# Patient Record
Sex: FEMALE | Race: WHITE | NOT HISPANIC OR LATINO
[De-identification: names, ages, dates, MRNs, and addresses within clinical notes are randomized per-mention and may not be internally consistent; named-entity substitution may affect disease eponyms.]

---

## 2017-01-12 ENCOUNTER — TRANSCRIPTION ENCOUNTER (OUTPATIENT)
Age: 52
End: 2017-01-12

## 2017-09-21 ENCOUNTER — TRANSCRIPTION ENCOUNTER (OUTPATIENT)
Age: 52
End: 2017-09-21

## 2017-11-08 ENCOUNTER — TRANSCRIPTION ENCOUNTER (OUTPATIENT)
Age: 52
End: 2017-11-08

## 2017-11-17 ENCOUNTER — APPOINTMENT (OUTPATIENT)
Dept: PAIN MANAGEMENT | Facility: CLINIC | Age: 52
End: 2017-11-17

## 2018-01-29 ENCOUNTER — TRANSCRIPTION ENCOUNTER (OUTPATIENT)
Age: 53
End: 2018-01-29

## 2018-02-06 ENCOUNTER — TRANSCRIPTION ENCOUNTER (OUTPATIENT)
Age: 53
End: 2018-02-06

## 2018-05-18 ENCOUNTER — TRANSCRIPTION ENCOUNTER (OUTPATIENT)
Age: 53
End: 2018-05-18

## 2018-12-28 ENCOUNTER — TRANSCRIPTION ENCOUNTER (OUTPATIENT)
Age: 53
End: 2018-12-28

## 2019-07-01 ENCOUNTER — TRANSCRIPTION ENCOUNTER (OUTPATIENT)
Age: 54
End: 2019-07-01

## 2019-08-21 ENCOUNTER — TRANSCRIPTION ENCOUNTER (OUTPATIENT)
Age: 54
End: 2019-08-21

## 2020-07-20 ENCOUNTER — TRANSCRIPTION ENCOUNTER (OUTPATIENT)
Age: 55
End: 2020-07-20

## 2022-04-27 ENCOUNTER — APPOINTMENT (OUTPATIENT)
Dept: ORTHOPEDIC SURGERY | Facility: CLINIC | Age: 57
End: 2022-04-27

## 2022-05-01 ENCOUNTER — FORM ENCOUNTER (OUTPATIENT)
Age: 57
End: 2022-05-01

## 2022-05-02 ENCOUNTER — APPOINTMENT (OUTPATIENT)
Dept: MRI IMAGING | Facility: CLINIC | Age: 57
End: 2022-05-02
Payer: MEDICARE

## 2022-05-02 PROCEDURE — 73221 MRI JOINT UPR EXTREM W/O DYE: CPT | Mod: RT,MH

## 2022-05-09 ENCOUNTER — APPOINTMENT (OUTPATIENT)
Dept: ORTHOPEDIC SURGERY | Facility: CLINIC | Age: 57
End: 2022-05-09
Payer: MEDICARE

## 2022-05-09 VITALS — WEIGHT: 142 LBS | BODY MASS INDEX: 22.82 KG/M2 | HEIGHT: 66 IN

## 2022-05-09 DIAGNOSIS — M75.01 ADHESIVE CAPSULITIS OF RIGHT SHOULDER: ICD-10-CM

## 2022-05-09 DIAGNOSIS — M25.511 PAIN IN RIGHT SHOULDER: ICD-10-CM

## 2022-05-09 DIAGNOSIS — Z98.890 OTHER SPECIFIED POSTPROCEDURAL STATES: ICD-10-CM

## 2022-05-09 PROCEDURE — 99214 OFFICE O/P EST MOD 30 MIN: CPT

## 2022-05-09 PROCEDURE — 73030 X-RAY EXAM OF SHOULDER: CPT | Mod: RT

## 2022-05-09 RX ORDER — METHYLPREDNISOLONE 4 MG/1
4 TABLET ORAL
Qty: 1 | Refills: 0 | Status: ACTIVE | COMMUNITY
Start: 2022-05-09 | End: 1900-01-01

## 2022-05-09 NOTE — REASON FOR VISIT
[FreeTextEntry2] : THis is a 56 year old RHD F disabled due to multiple spine fusions with right shoulder pain that started after doing biceps curl with incorrect form in early 2022.  No history.  She can't sleep.  Rx Naprosyn helps.  There is a history of a labral repair at [facility] in 2013.  There was no pre or post op dislocation.  She reports stiffness since the surgery.  She has done PT.  No injections, no numbness, or fcs.

## 2022-05-09 NOTE — HISTORY OF PRESENT ILLNESS
[Sudden] : sudden [10] : 10 [6] : 6 [Dull/Aching] : dull/aching [Constant] : constant [Meds] : meds [de-identified] : pt is here today for a new consult for her right shoulder. pt was seeing   [] : no [FreeTextEntry1] : right shoulder  [FreeTextEntry5] : pt was exercising a few months ago, doing curls and injured her right shoulder, she felt a pop in the shoulder. pt has a hx of surgery to that shoulder. pt has limited mobility to the shoulder  [FreeTextEntry7] : down the arm sometimes [FreeTextEntry9] : naproxen  [de-identified] : movement  [de-identified] : 03/2022 [de-identified] : dr.price  [de-identified] : 2013 [de-identified] : XR and MRI

## 2022-05-09 NOTE — DATA REVIEWED
[FreeTextEntry1] : MRI 5/2022: The AC joint is inflamed, as are the adjacent bones.  There is an infraspinatus calcium.\par \par XR 2V:  There is a Type II acromion.

## 2022-05-09 NOTE — PHYSICAL EXAM
[Right] : right shoulder [Sitting] : sitting [Severe] : severe [Moderate] : moderate [] : no erythema [FreeTextEntry8] : There is acromial tenderness. [TWNoteComboBox4] : passive forward flexion 135 degrees [TWNoteComboBox6] : internal rotation L2 [de-identified] : external rotation 45 degrees

## 2022-05-09 NOTE — IMAGING
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  The GH joint is OK.  The calcium is noted.

## 2022-05-09 NOTE — ASSESSMENT
[FreeTextEntry1] : We discussed the underlying pathology. \par Treatment options reviewed. \par Medrol is advised.\par After that she will resume Naprosyn.\par She will obtain the op report.\par PT lead to her injury in 2013, by her report.\par An injection could be considered with PT.\par Cautions discussed. \par Questions answered.\par

## 2022-05-27 RX ORDER — NAPROXEN 500 MG/1
500 TABLET, DELAYED RELEASE ORAL
Qty: 60 | Refills: 0 | Status: DISCONTINUED | COMMUNITY
Start: 2022-05-09 | End: 2022-05-27

## 2022-05-27 RX ORDER — NAPROXEN 500 MG/1
500 TABLET ORAL
Qty: 60 | Refills: 0 | Status: ACTIVE | COMMUNITY
Start: 2022-05-27 | End: 1900-01-01

## 2022-05-31 ENCOUNTER — FORM ENCOUNTER (OUTPATIENT)
Age: 57
End: 2022-05-31

## 2022-05-31 ENCOUNTER — APPOINTMENT (OUTPATIENT)
Dept: NEUROLOGY | Facility: CLINIC | Age: 57
End: 2022-05-31
Payer: MEDICARE

## 2022-05-31 VITALS
WEIGHT: 142 LBS | BODY MASS INDEX: 22.82 KG/M2 | SYSTOLIC BLOOD PRESSURE: 147 MMHG | HEIGHT: 66 IN | DIASTOLIC BLOOD PRESSURE: 83 MMHG | HEART RATE: 94 BPM

## 2022-05-31 DIAGNOSIS — M48.02 SPINAL STENOSIS, CERVICAL REGION: ICD-10-CM

## 2022-05-31 PROCEDURE — 99205 OFFICE O/P NEW HI 60 MIN: CPT

## 2022-06-02 NOTE — HISTORY OF PRESENT ILLNESS
[FreeTextEntry1] : Reason for consult: MS\par \par HPI: JESUS MANUEL ONTIVEROS is a 56 year old woman \par \par Referred by Dr. Brown, friend of his.\par 2009 - had cervical surgery 2/2 severe neck pain with radiation of pain down to both arms, some numbness and weakness in the hands and legs. \par ~2017 - 1d of trouble with RLE, the next day with abrupt R facial and body severe R sided weakness and numbness, had trouble urinating, no difficulty with the other leg. Was admitted to [facility]. Was told that she had a stroke based on MRI/MRA. However, was told that there were other lesions so they wanted to do an LP which she opted against. Did not get IVMP. Took 1y to recover to normal.\par Then saw an outpatient neurologist who felt that she had MS and recommended DMT which she opted agaist. \par 2018 - lumbar surgery 2/2 due too severe LBP and numbness in buttocks/upper LLE.\par \par Over past several months, has had several sx: halos in her vision, "bolt of lightning" in the R field, \par less appetite, weight loss, BM abnormalities, urinary dysfunction (one episode of incontinence but denies frequency or urgency). \par \par \par ROS/Current Sx:\par halos in her vision, "bolt of lightning" in the R field, \par less appetite\par weight loss\par BM abnormalities\par urinary dysfunction (one episode of incontinence but denies frequency or urgency). \par \par PMHX:\par lumbar surgery 2018\par stroke vs MS 2017\par 2009 - cervical surgery.\par \par MEDS:\par cymbalta 60 daily\par synthroid\par asa 162 daily\par MV\par vitD 2ku daily\par biotin\par \par ALL: levaquin, avelox\par \par SHx: 1ppd, 4-5 beer/day, no drugs. on disability.\par \par FHx: stroke in mother at age 66, father w/ stroke at age 91.\par \par Vitals: hypertensive, i advised her to discuss with pmd.\par \par \par Exam:\par \par AO3.  Normally conversant.  Follows commands, names, and repeats.  Good attention.\par \par PERRL, VFF, EOMI, BL definite dissociated nystagmus when looking to L more than R but with full eye movements, face symmetric, TUP at midline.\par \par Motor: \par                                                 R:                               L:\par Del                                           5                                5\par Bi                                              5                               5\par Tri                                            5                               5\par Wrist Extensors                      5                               5\par Finger abductors                    5                               5-\par                                         5                               5 \par \par HF                                           5                               5\par KE                                           5                               5\par KF                                           5                               5\par DF                                           5                               5\par PF                                           5                               5\par \par Tone                                       R                               L\par UE                                          0                                0 \par LE                                          0                                0\par \par Sensory                                RUE                      LUE                 RLE                LLE     \par LT                                           +                            +                      +                   +\par Vib                                          +                            +                     mild                   +\par JPS                                         +                            +                      +                   +\par PP                                         +                            +                      +                   +\par Temp                                     +                            +                      +                   +\par \par Reflexes:\par                                              R                             L                            \par Biceps                                  2                             2\par BR                                        2                             2\par Triceps                                2                              2\par Pat                                        2                            2 \par AJ                                        2                             2\par \par TOES                                    F                            F\par \par \par Coordination:\par                                              R                             L                       \par FTN                                       0                             0 \par ANDREI                                      0                            0\par HTS                                      0                             0 \par \par Other                                                                          \par  \par Gait: normal, completes heel, toe, and tandem walk well, though cautious with tandem\par \par                     Assistance: none\par \par \par MRI brain 2/2018 (zprad) - reviewed, notable for several small circular t2h that are NOT in a distribution suggestive of primary demyelination, R int cap lacune.\par \par MRI C spine 11/2016 - no cord lesions.\par \par \par AP: 55yo w/ abrupt R sided weakness in 2017 thought to be a stroke, with several brain lesions noted at the time on MRI that were deemed suspicious for MS. LP and ultimately DMT were recommended but patient had opted against this. She developed several nonspecific symptoms over several months in early 2022, of unclear etiology. Neuro exam in 5/2022 was notable only for subtle bilateral dissociated nystagmus. MRI brain in 2018 revealed only several nonspecific lesions, without cord lesions in MRI C spine in 2016. \par \par Unclear etiology of symptoms. Based on MRI in 2018, I do not have a suspicion for MS and I believe the episode in 2017 was likely a vascular event. \par \par all questions answered, education provided, management discussed at length.\par \par - PMD f/u for abnormal BM, weight loss, decreased appetite\par - check new MRI brain, C, and T wo contrast to r/o new lesions\par - likely will refer to stroke neurology if updated imaging is not suggestive of MS\par

## 2022-06-06 ENCOUNTER — NON-APPOINTMENT (OUTPATIENT)
Age: 57
End: 2022-06-06

## 2022-06-19 ENCOUNTER — NON-APPOINTMENT (OUTPATIENT)
Age: 57
End: 2022-06-19

## 2022-06-20 ENCOUNTER — APPOINTMENT (OUTPATIENT)
Dept: ORTHOPEDIC SURGERY | Facility: CLINIC | Age: 57
End: 2022-06-20

## 2022-06-22 ENCOUNTER — TRANSCRIPTION ENCOUNTER (OUTPATIENT)
Age: 57
End: 2022-06-22

## 2022-06-22 ENCOUNTER — APPOINTMENT (OUTPATIENT)
Dept: NEUROLOGY | Facility: CLINIC | Age: 57
End: 2022-06-22
Payer: MEDICARE

## 2022-06-22 DIAGNOSIS — S06.0X9A CONCUSSION WITH LOSS OF CONSCIOUSNESS OF UNSPECIFIED DURATION, INITIAL ENCOUNTER: ICD-10-CM

## 2022-06-22 DIAGNOSIS — Z86.73 PERSONAL HISTORY OF TRANSIENT ISCHEMIC ATTACK (TIA), AND CEREBRAL INFARCTION W/OUT RESIDUAL DEFICITS: ICD-10-CM

## 2022-06-22 PROCEDURE — 99215 OFFICE O/P EST HI 40 MIN: CPT

## 2022-06-22 RX ORDER — AZITHROMYCIN 250 MG/1
250 TABLET, FILM COATED ORAL
Qty: 6 | Refills: 0 | Status: ACTIVE | COMMUNITY
Start: 2022-02-02

## 2022-06-22 RX ORDER — TRAZODONE HYDROCHLORIDE 50 MG/1
50 TABLET ORAL
Qty: 90 | Refills: 0 | Status: ACTIVE | COMMUNITY
Start: 2022-01-08

## 2022-06-22 RX ORDER — METRONIDAZOLE 500 MG/1
500 TABLET ORAL
Qty: 30 | Refills: 0 | Status: ACTIVE | COMMUNITY
Start: 2022-06-14

## 2022-06-22 RX ORDER — BENZONATATE 200 MG/1
200 CAPSULE ORAL
Qty: 30 | Refills: 0 | Status: ACTIVE | COMMUNITY
Start: 2022-01-02

## 2022-06-24 ENCOUNTER — APPOINTMENT (OUTPATIENT)
Age: 57
End: 2022-06-24

## 2022-06-24 ENCOUNTER — NON-APPOINTMENT (OUTPATIENT)
Age: 57
End: 2022-06-24

## 2022-06-24 PROCEDURE — 70543 MRI ORBT/FAC/NCK W/O &W/DYE: CPT

## 2022-06-24 PROCEDURE — 70551 MRI BRAIN STEM W/O DYE: CPT

## 2022-06-24 PROCEDURE — 70544 MR ANGIOGRAPHY HEAD W/O DYE: CPT | Mod: 59

## 2022-06-24 PROCEDURE — A9585: CPT

## 2022-06-24 PROCEDURE — A9585: CPT | Mod: JW

## 2022-06-24 PROCEDURE — 70549 MR ANGIOGRAPH NECK W/O&W/DYE: CPT

## 2022-06-28 ENCOUNTER — NON-APPOINTMENT (OUTPATIENT)
Age: 57
End: 2022-06-28

## 2022-07-20 ENCOUNTER — APPOINTMENT (OUTPATIENT)
Dept: NEUROLOGY | Facility: CLINIC | Age: 57
End: 2022-07-20

## 2022-08-15 ENCOUNTER — APPOINTMENT (OUTPATIENT)
Dept: NEUROSURGERY | Facility: CLINIC | Age: 57
End: 2022-08-15

## 2022-10-26 ENCOUNTER — APPOINTMENT (OUTPATIENT)
Dept: NEUROLOGY | Facility: CLINIC | Age: 57
End: 2022-10-26

## 2022-11-21 ENCOUNTER — NON-APPOINTMENT (OUTPATIENT)
Age: 57
End: 2022-11-21

## 2023-06-03 ENCOUNTER — NON-APPOINTMENT (OUTPATIENT)
Age: 58
End: 2023-06-03

## 2024-11-15 ENCOUNTER — EMERGENCY (EMERGENCY)
Facility: HOSPITAL | Age: 59
LOS: 1 days | Discharge: ANOTHER HOSPITAL | End: 2024-11-15
Attending: EMERGENCY MEDICINE | Admitting: EMERGENCY MEDICINE
Payer: MEDICARE

## 2024-11-15 ENCOUNTER — INPATIENT (INPATIENT)
Facility: HOSPITAL | Age: 59
LOS: 9 days | Discharge: HOME | DRG: 881 | End: 2024-11-25
Attending: PSYCHIATRY & NEUROLOGY | Admitting: PSYCHIATRY & NEUROLOGY
Payer: MEDICARE

## 2024-11-15 VITALS
DIASTOLIC BLOOD PRESSURE: 76 MMHG | RESPIRATION RATE: 17 BRPM | OXYGEN SATURATION: 95 % | SYSTOLIC BLOOD PRESSURE: 132 MMHG | HEART RATE: 81 BPM | TEMPERATURE: 99 F

## 2024-11-15 VITALS
HEIGHT: 66 IN | WEIGHT: 134.92 LBS | SYSTOLIC BLOOD PRESSURE: 155 MMHG | RESPIRATION RATE: 18 BRPM | OXYGEN SATURATION: 97 % | TEMPERATURE: 98 F | DIASTOLIC BLOOD PRESSURE: 85 MMHG | HEART RATE: 109 BPM

## 2024-11-15 VITALS — HEART RATE: 86 BPM | DIASTOLIC BLOOD PRESSURE: 68 MMHG | SYSTOLIC BLOOD PRESSURE: 126 MMHG | TEMPERATURE: 98 F

## 2024-11-15 DIAGNOSIS — Z98.89 OTHER SPECIFIED POSTPROCEDURAL STATES: Chronic | ICD-10-CM

## 2024-11-15 DIAGNOSIS — Z98.1 ARTHRODESIS STATUS: Chronic | ICD-10-CM

## 2024-11-15 DIAGNOSIS — F33.2 MAJOR DEPRESSIVE DISORDER, RECURRENT SEVERE WITHOUT PSYCHOTIC FEATURES: ICD-10-CM

## 2024-11-15 LAB
ALBUMIN SERPL ELPH-MCNC: 3.7 G/DL — SIGNIFICANT CHANGE UP (ref 3.3–5)
ALP SERPL-CCNC: 86 U/L — SIGNIFICANT CHANGE UP (ref 30–120)
ALT FLD-CCNC: 24 U/L — SIGNIFICANT CHANGE UP (ref 10–60)
AMPHET UR-MCNC: NEGATIVE — SIGNIFICANT CHANGE UP
ANION GAP SERPL CALC-SCNC: 8 MMOL/L — SIGNIFICANT CHANGE UP (ref 5–17)
APAP SERPL-MCNC: <1 UG/ML — LOW (ref 10–30)
AST SERPL-CCNC: 27 U/L — SIGNIFICANT CHANGE UP (ref 10–40)
BARBITURATES UR SCN-MCNC: NEGATIVE — SIGNIFICANT CHANGE UP
BASOPHILS # BLD AUTO: 0.07 K/UL — SIGNIFICANT CHANGE UP (ref 0–0.2)
BASOPHILS NFR BLD AUTO: 0.8 % — SIGNIFICANT CHANGE UP (ref 0–2)
BENZODIAZ UR-MCNC: NEGATIVE — SIGNIFICANT CHANGE UP
BILIRUB SERPL-MCNC: 0.4 MG/DL — SIGNIFICANT CHANGE UP (ref 0.2–1.2)
BUN SERPL-MCNC: 14 MG/DL — SIGNIFICANT CHANGE UP (ref 7–23)
CALCIUM SERPL-MCNC: 9.7 MG/DL — SIGNIFICANT CHANGE UP (ref 8.4–10.5)
CHLORIDE SERPL-SCNC: 104 MMOL/L — SIGNIFICANT CHANGE UP (ref 96–108)
CO2 SERPL-SCNC: 27 MMOL/L — SIGNIFICANT CHANGE UP (ref 22–31)
COCAINE METAB.OTHER UR-MCNC: NEGATIVE — SIGNIFICANT CHANGE UP
CREAT SERPL-MCNC: 0.8 MG/DL — SIGNIFICANT CHANGE UP (ref 0.5–1.3)
EGFR: 85 ML/MIN/1.73M2 — SIGNIFICANT CHANGE UP
EOSINOPHIL # BLD AUTO: 0.2 K/UL — SIGNIFICANT CHANGE UP (ref 0–0.5)
EOSINOPHIL NFR BLD AUTO: 2.2 % — SIGNIFICANT CHANGE UP (ref 0–6)
ETHANOL SERPL-MCNC: <3 MG/DL — SIGNIFICANT CHANGE UP (ref 0–3)
GLUCOSE SERPL-MCNC: 106 MG/DL — HIGH (ref 70–99)
HCT VFR BLD CALC: 37.8 % — SIGNIFICANT CHANGE UP (ref 34.5–45)
HGB BLD-MCNC: 12.7 G/DL — SIGNIFICANT CHANGE UP (ref 11.5–15.5)
IMM GRANULOCYTES NFR BLD AUTO: 0.3 % — SIGNIFICANT CHANGE UP (ref 0–0.9)
LYMPHOCYTES # BLD AUTO: 1.39 K/UL — SIGNIFICANT CHANGE UP (ref 1–3.3)
LYMPHOCYTES # BLD AUTO: 15.4 % — SIGNIFICANT CHANGE UP (ref 13–44)
MCHC RBC-ENTMCNC: 30.4 PG — SIGNIFICANT CHANGE UP (ref 27–34)
MCHC RBC-ENTMCNC: 33.6 G/DL — SIGNIFICANT CHANGE UP (ref 32–36)
MCV RBC AUTO: 90.4 FL — SIGNIFICANT CHANGE UP (ref 80–100)
METHADONE UR-MCNC: NEGATIVE — SIGNIFICANT CHANGE UP
MONOCYTES # BLD AUTO: 0.81 K/UL — SIGNIFICANT CHANGE UP (ref 0–0.9)
MONOCYTES NFR BLD AUTO: 9 % — SIGNIFICANT CHANGE UP (ref 2–14)
NEUTROPHILS # BLD AUTO: 6.52 K/UL — SIGNIFICANT CHANGE UP (ref 1.8–7.4)
NEUTROPHILS NFR BLD AUTO: 72.3 % — SIGNIFICANT CHANGE UP (ref 43–77)
NRBC # BLD: 0 /100 WBCS — SIGNIFICANT CHANGE UP (ref 0–0)
OPIATES UR-MCNC: NEGATIVE — SIGNIFICANT CHANGE UP
PCP SPEC-MCNC: SIGNIFICANT CHANGE UP
PCP UR-MCNC: NEGATIVE — SIGNIFICANT CHANGE UP
PLATELET # BLD AUTO: 355 K/UL — SIGNIFICANT CHANGE UP (ref 150–400)
POTASSIUM SERPL-MCNC: 4.6 MMOL/L — SIGNIFICANT CHANGE UP (ref 3.5–5.3)
POTASSIUM SERPL-SCNC: 4.6 MMOL/L — SIGNIFICANT CHANGE UP (ref 3.5–5.3)
PROT SERPL-MCNC: 7.8 G/DL — SIGNIFICANT CHANGE UP (ref 6–8.3)
RBC # BLD: 4.18 M/UL — SIGNIFICANT CHANGE UP (ref 3.8–5.2)
RBC # FLD: 14 % — SIGNIFICANT CHANGE UP (ref 10.3–14.5)
SALICYLATES SERPL-MCNC: 2.1 MG/DL — LOW (ref 2.8–20)
SARS-COV-2 RNA SPEC QL NAA+PROBE: SIGNIFICANT CHANGE UP
SODIUM SERPL-SCNC: 139 MMOL/L — SIGNIFICANT CHANGE UP (ref 135–145)
THC UR QL: NEGATIVE — SIGNIFICANT CHANGE UP
WBC # BLD: 9.02 K/UL — SIGNIFICANT CHANGE UP (ref 3.8–10.5)
WBC # FLD AUTO: 9.02 K/UL — SIGNIFICANT CHANGE UP (ref 3.8–10.5)

## 2024-11-15 PROCEDURE — 80053 COMPREHEN METABOLIC PANEL: CPT

## 2024-11-15 PROCEDURE — 36415 COLL VENOUS BLD VENIPUNCTURE: CPT

## 2024-11-15 PROCEDURE — 93010 ELECTROCARDIOGRAM REPORT: CPT

## 2024-11-15 PROCEDURE — 93005 ELECTROCARDIOGRAM TRACING: CPT

## 2024-11-15 PROCEDURE — 87635 SARS-COV-2 COVID-19 AMP PRB: CPT

## 2024-11-15 PROCEDURE — 85025 COMPLETE CBC W/AUTO DIFF WBC: CPT

## 2024-11-15 PROCEDURE — 80307 DRUG TEST PRSMV CHEM ANLYZR: CPT

## 2024-11-15 PROCEDURE — 99285 EMERGENCY DEPT VISIT HI MDM: CPT

## 2024-11-15 PROCEDURE — 90792 PSYCH DIAG EVAL W/MED SRVCS: CPT | Mod: 2W

## 2024-11-15 PROCEDURE — 99285 EMERGENCY DEPT VISIT HI MDM: CPT | Mod: 25

## 2024-11-15 RX ORDER — LEVOTHYROXINE SODIUM 150 MCG
100 TABLET ORAL DAILY
Refills: 0 | Status: DISCONTINUED | OUTPATIENT
Start: 2024-11-16 | End: 2024-11-25

## 2024-11-15 RX ORDER — NICOTINE 21 MG/24H
1 PATCH, EXTENDED RELEASE TRANSDERMAL DAILY
Refills: 0 | Status: DISCONTINUED | OUTPATIENT
Start: 2024-11-16 | End: 2024-11-19

## 2024-11-15 RX ORDER — HALOPERIDOL 2 MG
5 TABLET ORAL EVERY 6 HOURS
Refills: 0 | Status: DISCONTINUED | OUTPATIENT
Start: 2024-11-16 | End: 2024-11-25

## 2024-11-15 RX ORDER — DULOXETINE HCL 60 MG
60 CAPSULE,DELAYED RELEASE (ENTERIC COATED) ORAL DAILY
Refills: 0 | Status: DISCONTINUED | OUTPATIENT
Start: 2024-11-16 | End: 2024-11-25

## 2024-11-15 RX ORDER — LORAZEPAM 2 MG/1
2 TABLET ORAL EVERY 6 HOURS
Refills: 0 | Status: DISCONTINUED | OUTPATIENT
Start: 2024-11-16 | End: 2024-11-21

## 2024-11-15 RX ORDER — PANTOPRAZOLE SODIUM 40 MG/1
40 TABLET, DELAYED RELEASE ORAL
Refills: 0 | Status: DISCONTINUED | OUTPATIENT
Start: 2024-11-16 | End: 2024-11-25

## 2024-11-15 NOTE — ED PROVIDER NOTE - NSICDXPASTMEDICALHX_GEN_ALL_CORE_FT
PAST MEDICAL HISTORY:  Cervical disc disease     Constipation due to pain medication     Depression (emotion)     Fibromyalgia     Hypothyroid     Lumbar back pain     MVP (mitral valve prolapse)     TIA (transient ischemic attack)     Vaso vagal episode

## 2024-11-15 NOTE — ED PROVIDER NOTE - OBJECTIVE STATEMENT
Patient referred by her therapist and psychiatrist for worsening depression with suicidal ideation.  Patient relates she has a longstanding history of depression has been struggling especially for the past year and patient had relapsed with her alcohol abuse approximately 6 months ago and took extra meds was banging her head on the wall to harm herself was admitted at [facility] psychiatric unit patient has continued to follow with her psychiatrist and therapist since then however her depression has been worsening especially the past few days and she began to have thoughts of harming herself and may be taking extra pills.  Patient denies any suicidal acts.  Patient lives by herself.

## 2024-11-15 NOTE — ED PROVIDER NOTE - CLINICAL SUMMARY MEDICAL DECISION MAKING FREE TEXT BOX
Patient referred by her therapist and psychiatrist for worsening depression with suicidal ideation.  Patient relates she has a longstanding history of depression has been struggling especially for the past year and patient had relapsed with her alcohol abuse approximately 6 months ago and took extra meds was banging her head on the wall to harm herself was admitted at [facility] psychiatric unit patient has continued to follow with her psychiatrist and therapist since then however her depression has been worsening especially the past few days and she began to have thoughts of harming herself and may be taking extra pills.  Patient denies any suicidal acts.  Patient lives by herself.    Plan EKG labs telepsych consult    Differential including but not limited to depression suicidal ideation

## 2024-11-15 NOTE — ED BEHAVIORAL HEALTH ASSESSMENT NOTE - HPI (INCLUDE ILLNESS QUALITY, SEVERITY, DURATION, TIMING, CONTEXT, MODIFYING FACTORS, ASSOCIATED SIGNS AND SYMPTOMS)
Patient is a 59 year old female, single, living alone, no dependents, on disability for neck injury, PPHx MDD, anxiety, alcohol use disorder (last drink 6 months ago), 3 prior suicide attempts via overdose and cutting, last was 6 months ago), 3 prior admissions, Outpatient psychiatrist Dr. Horan, outpatient therapist Alok iDnh (both at [facility]), no history of violence, PMHx cervical fusion, hypothyroidism, Stroke (2017), who presents to ED BIB self for worsening depression and SI.     Patient reports long history of depression. Depression has been getting worse of the last year. She was previously stable on Cymbalta 60mg daily. Patient was not able to tolerate Cymbalta 60mg BID in the past, but can't remember why. She does not think she tried other dosages. Patient reports the she was started on Buspar 15mg BID 1 week ago and since then depression has gotten much worse and she has started having severe SI. Patient has been having thoughts of guilt and being a burden to the family. Patient states "I just don't want to be here anymore". Patient states that she has been googling the easiest way to kill herself. She reports the she doesn't have a reason to live. Since starting Buspar she also reports increased irritability and agitation. Energy levels and motivation are very low and she has been struggling to maintain hygiene. Patient reports eating too much. Patient states that her therapist and psychiatrist have had concerns about her condition and both have recommended she go to the ED for psych evaluation. Patient continues to endorse SI, was tearful throughout interview.

## 2024-11-15 NOTE — ED BEHAVIORAL HEALTH ASSESSMENT NOTE - SUMMARY
Patient is a 59 year old female, single, living alone, no dependents, on disability for neck injury, PPHx MDD, anxiety, alcohol use disorder (last drink 6 months ago), 3 prior suicide attempts via overdose and cutting, last was 6 months ago), 3 prior admissions, Outpatient psychiatrist Dr. Horan, outpatient therapist Alok Dinh (both at [facility]), no history of violence, PMHx cervical fusion, hypothyroidism, Stroke (2017), who presents to ED BIB self for worsening depression and SI.     Patient's presentation is consistent with a severe episode of MDD. Patient's symptoms appear to have been exacerbated by starting Buspar this week. Patient now with worsening depressive symptoms and active SI with a plan. Patient's outpatient psychiatrist and therapist both advocated for admission and urged the patient to go to the ED. Patient tearful throughout interview and in acute distress. Patient is high risk for harm to self, particularly given her history of recent suicide attempt 6 months ago. Recommend inpatient psychiatric admission for stabilization and acute safety concerns. Patient agreeable to voluntary admission, but can 2PC if she rescinds.     PLAN:  - Inpatient psychiatric admission on voluntary basis  - Can 2PC if patient rescinds consent  - STOP home Buspar  - STOP home clonazepam  - Increase Cymbalta to 80mg daily  - Ativan 0.5mg PO BID PRN for anxiety/agitation  - Medical workup per primary team  - 1:1 in the ED, not needed on secure unit

## 2024-11-15 NOTE — ED PROVIDER NOTE - PROGRESS NOTE DETAILS
d/w telepsych will see pt Telepsych relates patient accepted for transfer to [facility] psych unit under Dr. Jacobson

## 2024-11-15 NOTE — ED BEHAVIORAL HEALTH NOTE - BEHAVIORAL HEALTH NOTE
===================   PRE-HOSPITAL COURSE   ==================   SOURCE: ED notes / Rn Mingo   DETAILS: Patient presenting with worsening depression   ============   ED COURSE   ============   SOURCE: ED notes / Rn: Mingo   ARRIVAL: BIBSelf for: worsening depression.    BELONGINGS: All belongings appropriately confiscated. Pt on a 1:1   BEHAVIOR: Patient reportedly has been calm and compliant in the ED. According to nurse the patient is Aox4. Reported to have good hygiene with no cuts or bruises present.  Has Endorsed Si to staff with no plan and no Hi. No hallucination present.    TREATMENT: None reported   VISITORS: Sister bedside.

## 2024-11-15 NOTE — ED ADULT TRIAGE NOTE - CHIEF COMPLAINT QUOTE
"I've been depressed and having dark thoughts"   endorses SI without specific plan, denies HI - constant observation initiated upon arrival

## 2024-11-15 NOTE — ED PROVIDER NOTE - NSICDXPASTSURGICALHX_GEN_ALL_CORE_FT
PAST SURGICAL HISTORY:  S/P appendectomy     S/P cervical spinal fusion times 3    S/P laminectomy     S/P shoulder surgery labral repair

## 2024-11-15 NOTE — ED PROVIDER NOTE - NSICDXFAMILYHX_GEN_ALL_CORE_FT
FAMILY HISTORY:  Mother  Still living? No  Family history of colon cancer, Age at diagnosis: Age Unknown  Family history of stroke, Age at diagnosis: Age Unknown

## 2024-11-15 NOTE — ED ADULT NURSE REASSESSMENT NOTE - DESCRIPTION
Pt in ED for reports of unmanageable depression, states she was having "not good thoughts". Pt is awake, tearful, cooperative with staff, 1:1 with pt. Pt denies any thoughts of harming herself or attempts in the her history. Pt for voluntary admission to psych, explained by MD, papers signed.

## 2024-11-15 NOTE — ED BEHAVIORAL HEALTH ASSESSMENT NOTE - DESCRIPTION
see MSW note close with 2 sisters and brother who live in the area. Adult son 36 years old live in another state. Recently ended a relationship. Hypothyroidism, Stroke 2017, chronic neck pain and spinal fusion

## 2024-11-16 DIAGNOSIS — F10.21 ALCOHOL DEPENDENCE, IN REMISSION: ICD-10-CM

## 2024-11-16 RX ORDER — IBUPROFEN 200 MG
600 TABLET ORAL EVERY 6 HOURS
Refills: 0 | Status: DISCONTINUED | OUTPATIENT
Start: 2024-11-16 | End: 2024-11-25

## 2024-11-16 RX ORDER — HYDROXYZINE HYDROCHLORIDE 25 MG/1
25 TABLET, FILM COATED ORAL EVERY 8 HOURS
Refills: 0 | Status: DISCONTINUED | OUTPATIENT
Start: 2024-11-16 | End: 2024-11-25

## 2024-11-16 RX ORDER — LORAZEPAM 2 MG/1
0.5 TABLET ORAL ONCE
Refills: 0 | Status: DISCONTINUED | OUTPATIENT
Start: 2024-11-16 | End: 2024-11-16

## 2024-11-16 RX ORDER — ACETAMINOPHEN, DIPHENHYDRAMINE HCL, PHENYLEPHRINE HCL 325; 25; 5 MG/1; MG/1; MG/1
5 TABLET ORAL AT BEDTIME
Refills: 0 | Status: DISCONTINUED | OUTPATIENT
Start: 2024-11-16 | End: 2024-11-25

## 2024-11-16 RX ORDER — ACETAMINOPHEN, DIPHENHYDRAMINE HCL, PHENYLEPHRINE HCL 325; 25; 5 MG/1; MG/1; MG/1
5 TABLET ORAL AT BEDTIME
Refills: 0 | Status: DISCONTINUED | OUTPATIENT
Start: 2024-11-16 | End: 2024-11-16

## 2024-11-16 RX ORDER — INFLUENZA VIRUS VACCINE 15; 15; 15; 15 UG/.5ML; UG/.5ML; UG/.5ML; UG/.5ML
0.5 SUSPENSION INTRAMUSCULAR ONCE
Refills: 0 | Status: COMPLETED | OUTPATIENT
Start: 2024-11-16 | End: 2024-11-16

## 2024-11-16 RX ADMIN — Medication 600 MILLIGRAM(S): at 19:30

## 2024-11-16 RX ADMIN — Medication 100 MICROGRAM(S): at 09:28

## 2024-11-16 RX ADMIN — PANTOPRAZOLE SODIUM 40 MILLIGRAM(S): 40 TABLET, DELAYED RELEASE ORAL at 07:32

## 2024-11-16 RX ADMIN — NICOTINE 1 PATCH: 21 PATCH, EXTENDED RELEASE TRANSDERMAL at 09:28

## 2024-11-16 RX ADMIN — Medication 81 MILLIGRAM(S): at 09:28

## 2024-11-16 RX ADMIN — Medication 600 MILLIGRAM(S): at 18:44

## 2024-11-16 RX ADMIN — Medication 60 MILLIGRAM(S): at 09:28

## 2024-11-16 RX ADMIN — HYDROXYZINE HYDROCHLORIDE 25 MILLIGRAM(S): 25 TABLET, FILM COATED ORAL at 21:32

## 2024-11-16 RX ADMIN — ACETAMINOPHEN, DIPHENHYDRAMINE HCL, PHENYLEPHRINE HCL 5 MILLIGRAM(S): 325; 25; 5 TABLET ORAL at 21:32

## 2024-11-16 RX ADMIN — LORAZEPAM 0.5 MILLIGRAM(S): 2 TABLET ORAL at 09:52

## 2024-11-16 NOTE — BH INPATIENT PSYCHIATRY ASSESSMENT NOTE - NSBHASSESSSUMMFT_PSY_ALL_CORE
Patient is a 59 year old female, privately domiciled, unemployed on disability, PPHx of self-reported depression, anxiety, and alcohol use disorder, multiple prior psychiatric hospitalizations, no known SA, PMHx of cervical disc disease s/p spinal fusion, MVP, TIA, hypothyroid, fibromyalgia, admitted for worsening mood and suicidal ideation.    On exam, patient is calm, cooperative, and mildly anxious. She reports worsening mood and new onset passive SI over the past months due to losing friendships, inability to work (currently on disability and would lose it if she worked), and feeling more isolated. She currently denies SI/HI/AH/VH.  Clinical diagnosis most likely for depression, unspecified and MDD remains on the differential although she does not currently meet criteria. Plan to continue home duloxetine 60mg daily and continue longitudinal assessments and plan for safe dispo.    -Continue home duloxetine 60mg daily  -Melatonin 5mg qhs insomnia  -Nicotine patch  -Haldol 5mg / Ativan 2mg PRN agitation  -Atarax 25mg PRN anxiety  -Continue home levothyroxine, pantoprazole Patient is a 59 year old female, privately domiciled, unemployed on disability, PPHx of self-reported depression, anxiety, and alcohol use disorder, multiple prior psychiatric hospitalizations, no known SA, history of NSSIB (cutting), PMHx of cervical disc disease s/p spinal fusion, MVP, TIA, hypothyroid, fibromyalgia, admitted for worsening mood and suicidal ideation.    On exam, patient is calm, cooperative, and mildly anxious. She reports worsening mood and new onset passive SI over the past months due to losing friendships, inability to work (currently on disability and would lose it if she worked), and feeling more isolated. She currently denies SI/HI/AH/VH.  Clinical diagnosis most likely for depression, unspecified and MDD remains on the differential although she does not currently meet criteria. Plan to continue home duloxetine 60mg daily and continue longitudinal assessments and plan for safe dispo.    -Continue home duloxetine 60mg daily  -Melatonin 5mg qhs insomnia  -Nicotine patch  -Haldol 5mg / Ativan 2mg PRN agitation  -Atarax 25mg PRN anxiety  -Continue home levothyroxine, pantoprazole

## 2024-11-16 NOTE — BH INPATIENT PSYCHIATRY ASSESSMENT NOTE - NSBHMETABOLIC_PSY_ALL_CORE_FT
BMI: BMI (kg/m2): 21.8 (11-15-24 @ 14:48)  HbA1c:   Glucose:   BP: 121/65 (11-16-24 @ 08:38) (121/65 - 132/76)Vital Signs Last 24 Hrs  T(C): 36.9 (11-16-24 @ 08:38), Max: 37.1 (11-15-24 @ 23:38)  T(F): 98.5 (11-16-24 @ 08:38), Max: 98.7 (11-15-24 @ 23:38)  HR: 80 (11-16-24 @ 08:38) (80 - 109)  BP: 121/65 (11-16-24 @ 08:38) (121/65 - 155/85)  BP(mean): --  RR: 17 (11-15-24 @ 23:38) (17 - 18)  SpO2: 95% (11-16-24 @ 08:38) (95% - 97%)      Lipid Panel:  BMI: BMI (kg/m2): 21.8 (11-15-24 @ 14:48)  HbA1c:   Glucose:   BP: 121/65 (11-16-24 @ 08:38) (121/65 - 132/76)Vital Signs Last 24 Hrs  T(C): 36.9 (11-16-24 @ 08:38), Max: 37.1 (11-15-24 @ 23:38)  T(F): 98.5 (11-16-24 @ 08:38), Max: 98.7 (11-15-24 @ 23:38)  HR: 80 (11-16-24 @ 08:38) (80 - 88)  BP: 121/65 (11-16-24 @ 08:38) (121/65 - 144/78)  BP(mean): --  RR: 17 (11-15-24 @ 23:38) (17 - 18)  SpO2: 95% (11-16-24 @ 08:38) (95% - 95%)      Lipid Panel:

## 2024-11-16 NOTE — BH INPATIENT PSYCHIATRY ASSESSMENT NOTE - HPI (INCLUDE ILLNESS QUALITY, SEVERITY, DURATION, TIMING, CONTEXT, MODIFYING FACTORS, ASSOCIATED SIGNS AND SYMPTOMS)
Patient is a 59 year old female, privately domiciled, unemployed on disability, PPHx of self-reported depression, anxiety, and alcohol use disorder, multiple prior psychiatric hospitalizations, no known SA, PMHx of cervical disc disease s/p spinal fusion, MVP, TIA, hypothyroid, fibromyalgia, admitted for worsening mood and suicidal ideation.      On exam, patient is calm, pleasant, mildly anxious. She currently denies SI/HI/AH/VH. She reports that she has been having worsening mood for the past few months in the setting of multiple psychosocial stressors. Specifically, she has lost touch with multiple friends, and has felt increasingly isolated because she does not work and has been at home with nothing to do. She is on disability and tried to work a few months ago, but was told that if she continued to work that she would have to give up her disability; last week she also found out last week that for the few months that she did work, she must now pay back this money. This made her feel more depressed, and over the past week she felt hopeless with a passive wish to die, although did not have any plan or intent (she thought about cutting her wrists or overdosing, however did not actually want to do it). She also endorses low energy and motivation to do things she likes, such as spend time with her friends. She endorses good appetite, and adequate sleep. She lives by herself, and reports that she often feels lonely. She has also reducing her use of cigarettes recently, which she feels has made her feel more irritable. She shared this with her psychiatrist, Dr. Horan at [facility], who started her on Buspar 15mg BID, but she felt that this made her suicidality worse. After discussing with her therapist, her therapist encouraged her to present to the ED.    She reports a history of 3 prior psychiatric hospitalizations, last 5 months ago in the setting of alcohol intoxication and SI. Since then, she has only drank maximum 1/2 glass of wine about 2x/month. Collins denies all other substances. She currently takes Duloxetine 60mg daily. She reports that at her prior psychiatric hospitalization, they tried to increase it to 120mg daily, but she did not like the way it made her feel. They also tried to taper her off of it, but this worsened her mood. She has tried Zoloft, Lexapro, and Effexor in the past, all of which were ineffective.  Patient is a 59 year old female, privately domiciled, unemployed on disability, PPHx of self-reported depression, anxiety, and alcohol use disorder, multiple prior psychiatric hospitalizations, no known SA, history of NSSIB (cutting), PMHx of cervical disc disease s/p spinal fusion, MVP, TIA, hypothyroid, fibromyalgia, admitted for worsening mood and suicidal ideation.      On exam, patient is calm, pleasant, mildly anxious. She currently denies SI/HI/AH/VH. She reports that she has been having worsening mood for the past few months in the setting of multiple psychosocial stressors. Specifically, she has lost touch with multiple friends, and has felt increasingly isolated because she does not work and has been at home with nothing to do. She is on disability and tried to work a few months ago, but was told that if she continued to work that she would have to give up her disability; last week she also found out last week that for the few months that she did work, she must now pay back this money. This made her feel more depressed, and over the past week she felt hopeless with a passive wish to die, although did not have any plan or intent (she thought about cutting her wrists or overdosing, however did not actually want to do it). She also endorses low energy and motivation to do things she likes, such as spend time with her friends. She endorses good appetite, and adequate sleep. She lives by herself, and reports that she often feels lonely. She has also reducing her use of cigarettes recently, which she feels has made her feel more irritable. She shared this with her psychiatrist, Dr. Horan at [facility], who started her on Buspar 15mg BID, but she felt that this made her suicidality worse. After discussing with her therapist, her therapist encouraged her to present to the ED.    She reports a history of 3 prior psychiatric hospitalizations, last 5 months ago in the setting of alcohol intoxication and SI. Since then, she has only drank maximum 1/2 glass of wine about 2x/month. She used to smoke 1 PPD but has been more recently using nicotine patch and tapering; now using 7mg patch. Collins denies all other substances. She currently takes Duloxetine 60mg daily. She reports that at her prior psychiatric hospitalization, they tried to increase it to 120mg daily, but she did not like the way it made her feel. They also tried to taper her off of it, but this worsened her mood. She has tried Zoloft, Lexapro, and Effexor in the past, all of which were ineffective.

## 2024-11-16 NOTE — BH INPATIENT PSYCHIATRY ASSESSMENT NOTE - CURRENT MEDICATION
MEDICATIONS  (STANDING):  aspirin  chewable 81 milliGRAM(s) Oral daily  DULoxetine 60 milliGRAM(s) Oral daily  levothyroxine 100 MICROGram(s) Oral daily  nicotine -   7 mG/24Hr(s) Patch 1 Patch Transdermal daily  pantoprazole    Tablet 40 milliGRAM(s) Oral before breakfast    MEDICATIONS  (PRN):  haloperidol     Tablet 5 milliGRAM(s) Oral every 6 hours PRN agitation  hydrOXYzine hydrochloride 25 milliGRAM(s) Oral every 8 hours PRN anxiety  LORazepam     Tablet 2 milliGRAM(s) Oral every 6 hours PRN Agitation  melatonin 5 milliGRAM(s) Oral at bedtime PRN Insomnia   MEDICATIONS  (STANDING):  aspirin  chewable 81 milliGRAM(s) Oral daily  DULoxetine 60 milliGRAM(s) Oral daily  levothyroxine 100 MICROGram(s) Oral daily  melatonin. 5 milliGRAM(s) Oral at bedtime  nicotine -   7 mG/24Hr(s) Patch 1 Patch Transdermal daily  pantoprazole    Tablet 40 milliGRAM(s) Oral before breakfast    MEDICATIONS  (PRN):  haloperidol     Tablet 5 milliGRAM(s) Oral every 6 hours PRN agitation  hydrOXYzine hydrochloride 25 milliGRAM(s) Oral every 8 hours PRN anxiety  LORazepam     Tablet 2 milliGRAM(s) Oral every 6 hours PRN Agitation

## 2024-11-16 NOTE — BH INPATIENT PSYCHIATRY ASSESSMENT NOTE - RISK ASSESSMENT
Suicide Risk: Chronic risk factors include prior self injurious behaviors, chronic mental illness, history of mood disorder, history of trauma. Acute risk factors include unemployment, hopelessness. Mitigating factors include the patient denies active SI, the patient is future-oriented, the patient is hopeful for the future, goal-directed for the future, able to communicate needs and seek help. Given these factors the patient is at a elevated chronic risk and a low acute risk of suicide.    Violence Risk: Current risk factors include none; the patient current denies any violent ideation. Mitigating factors include that the patient has been calm and cooperative on this assessment and the patient has no known history of violence. Given these factors, the patient is at a low risk of violence at this time.

## 2024-11-16 NOTE — BH INPATIENT PSYCHIATRY ASSESSMENT NOTE - NSBHCHARTREVIEWVS_PSY_A_CORE FT
Vital Signs Last 24 Hrs  T(C): 36.9 (11-16-24 @ 08:38), Max: 37.1 (11-15-24 @ 23:38)  T(F): 98.5 (11-16-24 @ 08:38), Max: 98.7 (11-15-24 @ 23:38)  HR: 80 (11-16-24 @ 08:38) (80 - 109)  BP: 121/65 (11-16-24 @ 08:38) (121/65 - 155/85)  BP(mean): --  RR: 17 (11-15-24 @ 23:38) (17 - 18)  SpO2: 95% (11-16-24 @ 08:38) (95% - 97%)     Vital Signs Last 24 Hrs  T(C): 36.9 (11-16-24 @ 08:38), Max: 37.1 (11-15-24 @ 23:38)  T(F): 98.5 (11-16-24 @ 08:38), Max: 98.7 (11-15-24 @ 23:38)  HR: 80 (11-16-24 @ 08:38) (80 - 88)  BP: 121/65 (11-16-24 @ 08:38) (121/65 - 144/78)  BP(mean): --  RR: 17 (11-15-24 @ 23:38) (17 - 18)  SpO2: 95% (11-16-24 @ 08:38) (95% - 95%)

## 2024-11-16 NOTE — BH INPATIENT PSYCHIATRY ASSESSMENT NOTE - DESCRIPTION
-Unemployed in disability (history of neck pain and spinal fusions, stroke in 2017)  -Privately domiciled alone

## 2024-11-16 NOTE — PSYCHIATRIC REHAB INITIAL EVALUATION - NSBHSUPNAMERELATIVE_PSY_ALL_CORE
Discussed with mom that is time for a psychiatry  She needs to be medicated with meds for impulsivity  Names given Bernadette Tan  /Dr Miguelina Gutierrez/ Dr Yoon    Pt. has four siblings that she is very close to. Pt. also has a son that lives out of state.

## 2024-11-16 NOTE — BH INPATIENT PSYCHIATRY ASSESSMENT NOTE - NSBHPHYSICALEXAM_PSY_ALL_CORE
General: Well appearing, no acute distress  Cardiovascular: RRR, no murmur, rub, gallop  Respiratory: LCTAB, no wheezing, rales, rhonchi  Abdominal: SNTND, no masses appreciated  Extr: VEEP

## 2024-11-16 NOTE — BH PATIENT PROFILE - HOME MEDICATIONS
Zofran ODT 4 mg oral tablet, disintegrating , 1 tab(s) orally every 6 hours, As Needed  Lyrica 50 mg oral capsule , 1 cap(s) orally 2 times a day  Valium 5 mg oral tablet , 1 tab(s) orally every 4 hours, As Needed  Dilaudid 4 mg oral tablet , 1 tab(s) orally every 4 hours, As Needed  levothyroxine 100 mcg (0.1 mg) oral capsule , 1 cap(s) orally once a day  CeleXA 40 mg oral tablet , 1 tab(s) orally once a day

## 2024-11-17 RX ADMIN — PANTOPRAZOLE SODIUM 40 MILLIGRAM(S): 40 TABLET, DELAYED RELEASE ORAL at 07:40

## 2024-11-17 RX ADMIN — Medication 100 MICROGRAM(S): at 10:24

## 2024-11-17 RX ADMIN — LORAZEPAM 2 MILLIGRAM(S): 2 TABLET ORAL at 21:03

## 2024-11-17 RX ADMIN — Medication 60 MILLIGRAM(S): at 10:24

## 2024-11-17 RX ADMIN — Medication 600 MILLIGRAM(S): at 10:27

## 2024-11-17 RX ADMIN — NICOTINE 1 PATCH: 21 PATCH, EXTENDED RELEASE TRANSDERMAL at 10:23

## 2024-11-17 RX ADMIN — ACETAMINOPHEN, DIPHENHYDRAMINE HCL, PHENYLEPHRINE HCL 5 MILLIGRAM(S): 325; 25; 5 TABLET ORAL at 21:02

## 2024-11-17 RX ADMIN — Medication 81 MILLIGRAM(S): at 10:24

## 2024-11-17 RX ADMIN — HYDROXYZINE HYDROCHLORIDE 25 MILLIGRAM(S): 25 TABLET, FILM COATED ORAL at 10:25

## 2024-11-17 RX ADMIN — NICOTINE 1 PATCH: 21 PATCH, EXTENDED RELEASE TRANSDERMAL at 10:29

## 2024-11-17 NOTE — BH INPATIENT PSYCHIATRY PROGRESS NOTE - NSBHFUPINTERVALHXFT_PSY_A_CORE
No acute interval events.    On exam, patient is anxious and tearful. She reports that she is worried that she will "end up like the other people here." I encouraged her not to compare herself to other patients and that informed her that there are a wide variety of patients on the unit, many of who are hospitalized for different reasons than her. She denies SI/HI/AH/VH. She reports feeling that she needs to either increase her duloxetine dose or try a different medication, but she notes that in the past when she tried either she did poorly so she is scared. I encouraged her to work with her primary team to discuss alternatives. Otherwise, endorses adequate sleep, good appetite. No acute concerns. Denies side effects to medication. After our discussion, we walked over to the nursing station so she could request Atarax.

## 2024-11-17 NOTE — BH INPATIENT PSYCHIATRY PROGRESS NOTE - CURRENT MEDICATION
MEDICATIONS  (STANDING):  aspirin  chewable 81 milliGRAM(s) Oral daily  DULoxetine 60 milliGRAM(s) Oral daily  levothyroxine 100 MICROGram(s) Oral daily  melatonin. 5 milliGRAM(s) Oral at bedtime  nicotine -   7 mG/24Hr(s) Patch 1 Patch Transdermal daily  pantoprazole    Tablet 40 milliGRAM(s) Oral before breakfast    MEDICATIONS  (PRN):  haloperidol     Tablet 5 milliGRAM(s) Oral every 6 hours PRN agitation  hydrOXYzine hydrochloride 25 milliGRAM(s) Oral every 8 hours PRN anxiety  ibuprofen  Tablet. 600 milliGRAM(s) Oral every 6 hours PRN Moderate Pain (4 - 6)  LORazepam     Tablet 2 milliGRAM(s) Oral every 6 hours PRN Agitation

## 2024-11-17 NOTE — BH INPATIENT PSYCHIATRY PROGRESS NOTE - NSBHMETABOLIC_PSY_ALL_CORE_FT
BMI: BMI (kg/m2): 21.8 (11-15-24 @ 14:48)  HbA1c:   Glucose:   BP: 126/71 (11-17-24 @ 16:59) (114/66 - 132/76)Vital Signs Last 24 Hrs  T(C): 37.2 (11-17-24 @ 16:59), Max: 37.2 (11-17-24 @ 16:59)  T(F): 99 (11-17-24 @ 16:59), Max: 99 (11-17-24 @ 16:59)  HR: 81 (11-17-24 @ 16:59) (80 - 81)  BP: 126/71 (11-17-24 @ 16:59) (114/66 - 126/71)  BP(mean): --  RR: 18 (11-17-24 @ 08:40) (18 - 18)  SpO2: 94% (11-17-24 @ 16:59) (94% - 95%)      Lipid Panel:

## 2024-11-17 NOTE — BH INPATIENT PSYCHIATRY PROGRESS NOTE - NSBHCHARTREVIEWVS_PSY_A_CORE FT
Vital Signs Last 24 Hrs  T(C): 37.2 (11-17-24 @ 16:59), Max: 37.2 (11-17-24 @ 16:59)  T(F): 99 (11-17-24 @ 16:59), Max: 99 (11-17-24 @ 16:59)  HR: 81 (11-17-24 @ 16:59) (80 - 81)  BP: 126/71 (11-17-24 @ 16:59) (114/66 - 126/71)  BP(mean): --  RR: 18 (11-17-24 @ 08:40) (18 - 18)  SpO2: 94% (11-17-24 @ 16:59) (94% - 95%)

## 2024-11-17 NOTE — BH INPATIENT PSYCHIATRY PROGRESS NOTE - PRN MEDS
MEDICATIONS  (PRN):  haloperidol     Tablet 5 milliGRAM(s) Oral every 6 hours PRN agitation  hydrOXYzine hydrochloride 25 milliGRAM(s) Oral every 8 hours PRN anxiety  ibuprofen  Tablet. 600 milliGRAM(s) Oral every 6 hours PRN Moderate Pain (4 - 6)  LORazepam     Tablet 2 milliGRAM(s) Oral every 6 hours PRN Agitation

## 2024-11-18 PROCEDURE — 99232 SBSQ HOSP IP/OBS MODERATE 35: CPT

## 2024-11-18 RX ADMIN — Medication 60 MILLIGRAM(S): at 10:49

## 2024-11-18 RX ADMIN — HYDROXYZINE HYDROCHLORIDE 25 MILLIGRAM(S): 25 TABLET, FILM COATED ORAL at 21:32

## 2024-11-18 RX ADMIN — Medication 81 MILLIGRAM(S): at 10:49

## 2024-11-18 RX ADMIN — PANTOPRAZOLE SODIUM 40 MILLIGRAM(S): 40 TABLET, DELAYED RELEASE ORAL at 06:16

## 2024-11-18 RX ADMIN — NICOTINE 1 PATCH: 21 PATCH, EXTENDED RELEASE TRANSDERMAL at 15:33

## 2024-11-18 RX ADMIN — ACETAMINOPHEN, DIPHENHYDRAMINE HCL, PHENYLEPHRINE HCL 5 MILLIGRAM(S): 325; 25; 5 TABLET ORAL at 21:32

## 2024-11-18 RX ADMIN — Medication 100 MICROGRAM(S): at 10:49

## 2024-11-18 RX ADMIN — NICOTINE 1 PATCH: 21 PATCH, EXTENDED RELEASE TRANSDERMAL at 10:49

## 2024-11-18 NOTE — BH TREATMENT PLAN - NSTXSUICIDINTERMD_PSY_ALL_CORE
consider augmentation of Cymbalta or tapering Cymbalta with Latuda or Lithium . psychopharmacology 15 minutes a day 
Fall Risk

## 2024-11-18 NOTE — BH TREATMENT PLAN - NSTXDEPRESINTERSW_PSY_ALL_CORE
This SW will outreach to the patient's [facility] psychiatrist & psychotherapist  to schedule aftercare appointments. This SW will also provide the patient with community resources, and liaison with patient's family as-needed.

## 2024-11-18 NOTE — BH SOCIAL WORK INITIAL PSYCHOSOCIAL EVALUATION - NSBHHOUSECOMMENTFT_PSY_ALL_CORE
The patient is domiciled alone. Per chart review, the patient's address is: [address] [place] [address].

## 2024-11-18 NOTE — BH INPATIENT PSYCHIATRY PROGRESS NOTE - NSBHATTESTCOMMENTATTENDFT_PSY_A_CORE
;;11/18: while Not endorsing  suicidal or homicidal ideation intent or plans; no mention of auditory or visual hallucinations  acutely sad; anxious worried willing to try another medication but worries about coming off of Cymbalta;  would endorse trial of Latuda given that brother has bipolar illness.

## 2024-11-18 NOTE — BH INPATIENT PSYCHIATRY PROGRESS NOTE - NSBHMSEAFFRANGE_PSY_A_CORE
Labile Sudden tearfulness and slight crying repeatedly mid-conversation without clear triggers/Labile/Constricted

## 2024-11-18 NOTE — BH INPATIENT PSYCHIATRY PROGRESS NOTE - NSBHCHARTREVIEWVS_PSY_A_CORE FT
Vital Signs Last 24 Hrs  T(C): 36.9 (11-18-24 @ 09:52), Max: 37.2 (11-17-24 @ 16:59)  T(F): 98.5 (11-18-24 @ 09:52), Max: 99 (11-17-24 @ 16:59)  HR: 81 (11-18-24 @ 09:52) (81 - 81)  BP: 120/71 (11-18-24 @ 09:52) (120/71 - 126/71)  BP(mean): --  RR: --  SpO2: 94% (11-18-24 @ 09:52) (94% - 94%)     Vital Signs Last 24 Hrs  T(C): 36.9 (11-18-24 @ 09:52), Max: 36.9 (11-18-24 @ 09:52)  T(F): 98.5 (11-18-24 @ 09:52), Max: 98.5 (11-18-24 @ 09:52)  HR: 81 (11-18-24 @ 09:52) (81 - 81)  BP: 120/71 (11-18-24 @ 09:52) (120/71 - 120/71)  BP(mean): --  RR: --  SpO2: 94% (11-18-24 @ 09:52) (94% - 94%)     Vital Signs Last 24 Hrs  T(C): 36.6 (11-20-24 @ 09:24), Max: 37.2 (11-19-24 @ 17:02)  T(F): 97.9 (11-20-24 @ 09:24), Max: 99 (11-19-24 @ 17:02)  HR: 91 (11-20-24 @ 09:24) (77 - 91)  BP: 127/70 (11-20-24 @ 09:24) (127/70 - 129/71)  BP(mean): --  RR: --  SpO2: 96% (11-20-24 @ 09:24) (96% - 96%)

## 2024-11-18 NOTE — BH SOCIAL WORK INITIAL PSYCHOSOCIAL EVALUATION - NSBHTREATHXNAMEFT_PSY_ALL_CORE
[facility]  Dr. Horan (Psychiatrist)  lAok Dinh (Psychotherapist)  [place], [place]

## 2024-11-18 NOTE — BH INPATIENT PSYCHIATRY PROGRESS NOTE - NSBHFUPINTERVALHXFT_PSY_A_CORE
No significant overnight events. Patient denies any concerns with appetite/weight changes or difficulty sleeping while taking melatonin. She is open and cooperative with evaluation, and while endorsing "ok" mood, is intermittently tearful. She denies thoughts of hurting others or currently of hurting herself. She confirms that she has had increasingly frequent thoughts of suicide for 6 months in addition to worsened mood/depression/anxiety for the past year. She states that it had gotten to the point of daily SI in which she would think about how various objects in her home could be used to end her life. She reports telling her family that if she can't find the right medication or otherwise improve how she has been feeling, she does not "want to be here anymore". She confirmed multiple recent stressors including having to give away her long term pet cat due to thoughts of throwing the cat down the stairs, financial stress due to misunderstandings of work restrictions while on disability, and social isolation due to "cutting off my friends" and fears of burdening her family. She describes intermittent poor memory and more significantly severe anxiety about forgetting important details (such as details of her health relevant to treatment). She endorses general anxiety regarding future events as well as associated symptoms like significant muscle (shoulder/neck) tension worsened by anxiety.    She states that Buspar was ineffective and endorses adverse reactions to Levequin/Avalox/Lamotrigine. She has previously trialed higher doses of Cymbalta than her current 60 mg without significant benefit, and she was unable to tolerate tapering Cymbalta in past (describes 4-5 days of crying constantly and otherwise feeling awful, denies zaps/electric shocks). Discussed initial decision to use Cymbalta to target pain from spinal condition in addition to depression and anxiety. She newly confirms a history of Bipolar Disorder in her brother as well as extensive family history of depression which raises the concern for possible Bipolar Depression in this patient. Provided education and discussion regarding this condition and possible treatment with Lurasidone/Latuda. Encouraged patient to request Atarax from the nurses station if she begins to feel acutely anxious.    She provided consent to speak with her family and provided contact information for her sister Tamiko Vegas (#[phone]). She confirmed that her brother Mikel is stabilized on medication if the team wants to speak to him about his treatment. No significant overnight events. Patient denies any concerns with appetite/weight changes or difficulty sleeping while taking melatonin. She is open and cooperative with evaluation, and while endorsing "ok" mood, is intermittently tearful. She denies thoughts of hurting others or currently of hurting herself. She confirms that she has had increasingly frequent thoughts of suicide for 6 months in addition to worsened mood/depression/anxiety for the past year. She states that it had gotten to the point of daily SI in which she would think about how various objects in her home could be used to end her life. She reports telling her family that if she can't find the right medication or otherwise improve how she has been feeling, she does not "want to be here anymore". She confirmed multiple recent stressors including having to give away her long term pet cat due to thoughts of throwing the cat down the stairs, financial stress due to misunderstandings of work restrictions while on disability, and social isolation due to "cutting off my friends" and fears of burdening her family. She describes intermittent poor memory and more significantly severe anxiety about forgetting important details (such as details of her health relevant to treatment). She endorses general anxiety regarding future events as well as associated symptoms like significant muscle (shoulder/neck) tension worsened by anxiety.    She states that Buspar was ineffective and endorses adverse reactions to Levequin/Avalox/Lamotrigine. She has previously tryed higher doses of Cymbalta than her current 60 mg without significant benefit, and she was unable to tolerate tapering Cymbalta in past (describes 4-5 days of crying constantly and otherwise feeling awful, denies zaps/electric shocks). Discussed initial decision to use Cymbalta to target pain from spinal condition in addition to depression and anxiety. She newly confirms a history of Bipolar Disorder in her brother as well as extensive family history of depression which raises the concern for possible Bipolar Depression in this patient. Provided education and discussion regarding this condition and possible treatment with Lurasidone/Latuda. Encouraged patient to request Atarax from the nurses station if she begins to feel acutely anxious.    She provided consent to speak with her family and provided contact information for her sister aTmiko Vegas (#[phone]). She confirmed that her brother Mikel is stabilized on medication if the team wants to speak to him about his treatment.

## 2024-11-18 NOTE — BH INPATIENT PSYCHIATRY PROGRESS NOTE - NSBHMETABOLIC_PSY_ALL_CORE_FT
BMI: BMI (kg/m2): 21.8 (11-15-24 @ 14:48)  HbA1c:   Glucose:   BP: 120/71 (11-18-24 @ 09:52) (114/66 - 132/76)Vital Signs Last 24 Hrs  T(C): 36.9 (11-18-24 @ 09:52), Max: 37.2 (11-17-24 @ 16:59)  T(F): 98.5 (11-18-24 @ 09:52), Max: 99 (11-17-24 @ 16:59)  HR: 81 (11-18-24 @ 09:52) (81 - 81)  BP: 120/71 (11-18-24 @ 09:52) (120/71 - 126/71)  BP(mean): --  RR: --  SpO2: 94% (11-18-24 @ 09:52) (94% - 94%)      Lipid Panel:  BMI: BMI (kg/m2): 21.8 (11-15-24 @ 14:48)  HbA1c:   Glucose:   BP: 120/71 (11-18-24 @ 09:52) (114/66 - 132/76)Vital Signs Last 24 Hrs  T(C): 36.9 (11-18-24 @ 09:52), Max: 36.9 (11-18-24 @ 09:52)  T(F): 98.5 (11-18-24 @ 09:52), Max: 98.5 (11-18-24 @ 09:52)  HR: 81 (11-18-24 @ 09:52) (81 - 81)  BP: 120/71 (11-18-24 @ 09:52) (120/71 - 120/71)  BP(mean): --  RR: --  SpO2: 94% (11-18-24 @ 09:52) (94% - 94%)      Lipid Panel:  BMI: BMI (kg/m2): 21.8 (11-15-24 @ 14:48)  HbA1c:   Glucose:   BP: 127/70 (11-20-24 @ 09:24) (114/66 - 129/71)Vital Signs Last 24 Hrs  T(C): 36.6 (11-20-24 @ 09:24), Max: 37.2 (11-19-24 @ 17:02)  T(F): 97.9 (11-20-24 @ 09:24), Max: 99 (11-19-24 @ 17:02)  HR: 91 (11-20-24 @ 09:24) (77 - 91)  BP: 127/70 (11-20-24 @ 09:24) (127/70 - 129/71)  BP(mean): --  RR: --  SpO2: 96% (11-20-24 @ 09:24) (96% - 96%)      Lipid Panel:

## 2024-11-18 NOTE — BH TREATMENT PLAN - NSTXSUICIDINTERSW_PSY_ALL_CORE
This SW will outreach to the patient's [facility] psychiatrist & psychotherapist  to schedule aftercare appointments. This SW will also provide the patient with community resources, and liaison with patient's family as-needed.

## 2024-11-18 NOTE — BH TREATMENT PLAN - NSCMSPTSTRENGTHS_PSY_ALL_CORE
Prescribed mealtime to help with sleep  Recommend if there is no improvement symptoms, that we may have to refer to [facility] 
Interpersonal skills/Motivated/Supportive family
Interpersonal skills/Motivated/Supportive family

## 2024-11-18 NOTE — BH TREATMENT PLAN - NSTXDEPRESINTERMD_PSY_ALL_CORE
consider augmentation of Cymbalta or tapering Cymbalta with Latuda or Lithium . psychopharmacology 15 minutes a day

## 2024-11-18 NOTE — BH INPATIENT PSYCHIATRY PROGRESS NOTE - NSBHMSETHTCONTENT_PSY_A_CORE
Unremarkable Describes significant feelings of being a burden to family, poor self image/Unremarkable/Ruminations/Guilt

## 2024-11-18 NOTE — BH INPATIENT PSYCHIATRY PROGRESS NOTE - CURRENT MEDICATION
MEDICATIONS  (STANDING):  aspirin  chewable 81 milliGRAM(s) Oral daily  DULoxetine 60 milliGRAM(s) Oral daily  levothyroxine 100 MICROGram(s) Oral daily  melatonin. 5 milliGRAM(s) Oral at bedtime  nicotine -   7 mG/24Hr(s) Patch 1 Patch Transdermal daily  pantoprazole    Tablet 40 milliGRAM(s) Oral before breakfast    MEDICATIONS  (PRN):  haloperidol     Tablet 5 milliGRAM(s) Oral every 6 hours PRN agitation  hydrOXYzine hydrochloride 25 milliGRAM(s) Oral every 8 hours PRN anxiety  ibuprofen  Tablet. 600 milliGRAM(s) Oral every 6 hours PRN Moderate Pain (4 - 6)  LORazepam     Tablet 2 milliGRAM(s) Oral every 6 hours PRN Agitation   MEDICATIONS  (STANDING):  aspirin  chewable 81 milliGRAM(s) Oral daily  cholecalciferol 1000 Unit(s) Oral daily  DULoxetine 60 milliGRAM(s) Oral daily  levothyroxine 100 MICROGram(s) Oral daily  lurasidone 20 milliGRAM(s) Oral at bedtime  melatonin. 5 milliGRAM(s) Oral at bedtime  nicotine -  14 mG/24Hr(s) Patch 14 Patch Transdermal daily  pantoprazole    Tablet 40 milliGRAM(s) Oral before breakfast  rosuvastatin 5 milliGRAM(s) Oral at bedtime    MEDICATIONS  (PRN):  haloperidol     Tablet 5 milliGRAM(s) Oral every 6 hours PRN agitation  hydrOXYzine hydrochloride 25 milliGRAM(s) Oral every 8 hours PRN anxiety  ibuprofen  Tablet. 600 milliGRAM(s) Oral every 6 hours PRN Moderate Pain (4 - 6)  LORazepam     Tablet 2 milliGRAM(s) Oral every 6 hours PRN Agitation

## 2024-11-18 NOTE — BH SOCIAL WORK INITIAL PSYCHOSOCIAL EVALUATION - NSBHSACONSEQUENCE_PSY_A_CORE FT
History of alcohol use disorder and now in AA. The patient reports she attends nightly AA meetings.

## 2024-11-18 NOTE — BH SOCIAL WORK INITIAL PSYCHOSOCIAL EVALUATION - NSBHSAALC_PSY_A_CORE FT
Per chart review: The patient reports that she was last intoxicated on alcohol approximately 5 months ago. Since then, she has only drank maximum 1/2 glass of wine about 2x/month.

## 2024-11-18 NOTE — BH SOCIAL WORK INITIAL PSYCHOSOCIAL EVALUATION - OTHER PAST PSYCHIATRIC HISTORY (INCLUDE DETAILS REGARDING ONSET, COURSE OF ILLNESS, INPATIENT/OUTPATIENT TREATMENT)
Per chart review: Patient is a 59 year old female, privately domiciled, unemployed on disability, PPHx of self-reported depression, anxiety, and alcohol use disorder, multiple prior psychiatric hospitalizations, no known SA, history of NSSIB (cutting), PMHx of cervical disc disease s/p spinal fusion, MVP, TIA, hypothyroid, fibromyalgia, admitted for worsening mood and suicidal ideation.

## 2024-11-18 NOTE — BH SOCIAL WORK INITIAL PSYCHOSOCIAL EVALUATION - NSBHTREATHXDATEFT_PSY_ALL_CORE
Per patient, approximately 6 months. The patient has weekly Thursdays @ 11AM appointments with her psychotherapist & monthly psychiatric appointments.

## 2024-11-18 NOTE — BH SOCIAL WORK INITIAL PSYCHOSOCIAL EVALUATION - NSHIGHRISKBEHFT_PSY_ALL_CORE
Per chart review: History of NSSIB (cutting), admitted for worsening mood and suicidal ideation.

## 2024-11-18 NOTE — BH SOCIAL WORK INITIAL PSYCHOSOCIAL EVALUATION - NSBHSUPPORTNAME_PSY_ALL_CORE
The patient reports that her 2 sisters, brother, adult son, and AA group comprise her support system

## 2024-11-18 NOTE — BH SOCIAL WORK INITIAL PSYCHOSOCIAL EVALUATION - DETAILS
The patient reports that her brother has bipolar disorder, and mentioned that her 2 sisters are both on anti-depressants.

## 2024-11-19 PROCEDURE — 99232 SBSQ HOSP IP/OBS MODERATE 35: CPT

## 2024-11-19 RX ORDER — LURASIDONE HYDROCHLORIDE 120 MG/1
40 TABLET, FILM COATED ORAL AT BEDTIME
Refills: 0 | Status: DISCONTINUED | OUTPATIENT
Start: 2024-11-21 | End: 2024-11-25

## 2024-11-19 RX ORDER — NICOTINE 21 MG/24H
14 PATCH, EXTENDED RELEASE TRANSDERMAL DAILY
Refills: 0 | Status: DISCONTINUED | OUTPATIENT
Start: 2024-11-19 | End: 2024-11-23

## 2024-11-19 RX ORDER — LURASIDONE HYDROCHLORIDE 120 MG/1
20 TABLET, FILM COATED ORAL AT BEDTIME
Refills: 0 | Status: COMPLETED | OUTPATIENT
Start: 2024-11-19 | End: 2024-11-20

## 2024-11-19 RX ADMIN — Medication 60 MILLIGRAM(S): at 10:10

## 2024-11-19 RX ADMIN — Medication 81 MILLIGRAM(S): at 10:09

## 2024-11-19 RX ADMIN — NICOTINE 1 PATCH: 21 PATCH, EXTENDED RELEASE TRANSDERMAL at 10:10

## 2024-11-19 RX ADMIN — Medication 100 MICROGRAM(S): at 10:09

## 2024-11-19 RX ADMIN — HYDROXYZINE HYDROCHLORIDE 25 MILLIGRAM(S): 25 TABLET, FILM COATED ORAL at 10:11

## 2024-11-19 RX ADMIN — NICOTINE 1 PATCH: 21 PATCH, EXTENDED RELEASE TRANSDERMAL at 19:53

## 2024-11-19 RX ADMIN — Medication 600 MILLIGRAM(S): at 11:25

## 2024-11-19 RX ADMIN — LURASIDONE HYDROCHLORIDE 20 MILLIGRAM(S): 120 TABLET, FILM COATED ORAL at 21:32

## 2024-11-19 RX ADMIN — PANTOPRAZOLE SODIUM 40 MILLIGRAM(S): 40 TABLET, DELAYED RELEASE ORAL at 07:25

## 2024-11-19 RX ADMIN — Medication 600 MILLIGRAM(S): at 12:15

## 2024-11-19 RX ADMIN — LORAZEPAM 2 MILLIGRAM(S): 2 TABLET ORAL at 19:49

## 2024-11-19 RX ADMIN — ACETAMINOPHEN, DIPHENHYDRAMINE HCL, PHENYLEPHRINE HCL 5 MILLIGRAM(S): 325; 25; 5 TABLET ORAL at 21:33

## 2024-11-19 NOTE — BH INPATIENT PSYCHIATRY PROGRESS NOTE - NSBHMETABOLIC_PSY_ALL_CORE_FT
BMI: BMI (kg/m2): 21.8 (11-15-24 @ 14:48)  HbA1c:   Glucose:   BP: 120/70 (11-19-24 @ 08:38) (114/66 - 127/75)Vital Signs Last 24 Hrs  T(C): 36.4 (11-19-24 @ 08:38), Max: 37 (11-18-24 @ 17:00)  T(F): 97.6 (11-19-24 @ 08:38), Max: 98.6 (11-18-24 @ 17:00)  HR: 77 (11-19-24 @ 08:38) (77 - 97)  BP: 120/70 (11-19-24 @ 08:38) (114/66 - 120/70)  BP(mean): --  RR: 18 (11-19-24 @ 08:38) (18 - 18)  SpO2: 95% (11-19-24 @ 08:38) (95% - 95%)      Lipid Panel:  BMI: BMI (kg/m2): 21.8 (11-15-24 @ 14:48)  HbA1c:   Glucose:   BP: 129/71 (11-19-24 @ 17:02) (114/66 - 129/71)Vital Signs Last 24 Hrs  T(C): 37.2 (11-19-24 @ 17:02), Max: 37.2 (11-19-24 @ 17:02)  T(F): 99 (11-19-24 @ 17:02), Max: 99 (11-19-24 @ 17:02)  HR: 77 (11-19-24 @ 17:02) (77 - 77)  BP: 129/71 (11-19-24 @ 17:02) (120/70 - 129/71)  BP(mean): --  RR: 18 (11-19-24 @ 08:38) (18 - 18)  SpO2: 96% (11-19-24 @ 17:02) (95% - 96%)      Lipid Panel:  BMI: BMI (kg/m2): 21.8 (11-15-24 @ 14:48)  HbA1c:   Glucose:   BP: 127/70 (11-20-24 @ 09:24) (114/66 - 129/71)Vital Signs Last 24 Hrs  T(C): 36.6 (11-20-24 @ 09:24), Max: 37.2 (11-19-24 @ 17:02)  T(F): 97.9 (11-20-24 @ 09:24), Max: 99 (11-19-24 @ 17:02)  HR: 91 (11-20-24 @ 09:24) (77 - 91)  BP: 127/70 (11-20-24 @ 09:24) (127/70 - 129/71)  BP(mean): --  RR: --  SpO2: 96% (11-20-24 @ 09:24) (96% - 96%)      Lipid Panel:

## 2024-11-19 NOTE — BH INPATIENT PSYCHIATRY PROGRESS NOTE - NSBHMSEAFFRANGE_PSY_A_CORE
Sudden tearfulness and slight crying repeatedly mid-conversation without clear triggers/Labile/Constricted Mildly constricted but no significant lability, no sudden tearfulness./Constricted

## 2024-11-19 NOTE — BH INPATIENT PSYCHIATRY PROGRESS NOTE - NSBHATTESTCOMMENTATTENDFT_PSY_A_CORE
;;11/19: began trial of Latuda; patient might be feeling a bit better Not endorsing  suicidal or homicidal ideation intent or plans; no mention of auditory or visual hallucinations . anxious;  again concerned about not lowering Cymbalta.  continue trial of Latuda with plan to raise to 40mg a day  in a day or so.

## 2024-11-19 NOTE — BH INPATIENT PSYCHIATRY PROGRESS NOTE - CURRENT MEDICATION
MEDICATIONS  (STANDING):  aspirin  chewable 81 milliGRAM(s) Oral daily  DULoxetine 60 milliGRAM(s) Oral daily  levothyroxine 100 MICROGram(s) Oral daily  melatonin. 5 milliGRAM(s) Oral at bedtime  nicotine -   7 mG/24Hr(s) Patch 1 Patch Transdermal daily  pantoprazole    Tablet 40 milliGRAM(s) Oral before breakfast    MEDICATIONS  (PRN):  haloperidol     Tablet 5 milliGRAM(s) Oral every 6 hours PRN agitation  hydrOXYzine hydrochloride 25 milliGRAM(s) Oral every 8 hours PRN anxiety  ibuprofen  Tablet. 600 milliGRAM(s) Oral every 6 hours PRN Moderate Pain (4 - 6)  LORazepam     Tablet 2 milliGRAM(s) Oral every 6 hours PRN Agitation   MEDICATIONS  (STANDING):  aspirin  chewable 81 milliGRAM(s) Oral daily  DULoxetine 60 milliGRAM(s) Oral daily  levothyroxine 100 MICROGram(s) Oral daily  lurasidone 20 milliGRAM(s) Oral at bedtime  melatonin. 5 milliGRAM(s) Oral at bedtime  nicotine -  14 mG/24Hr(s) Patch 14 Patch Transdermal daily  pantoprazole    Tablet 40 milliGRAM(s) Oral before breakfast    MEDICATIONS  (PRN):  haloperidol     Tablet 5 milliGRAM(s) Oral every 6 hours PRN agitation  hydrOXYzine hydrochloride 25 milliGRAM(s) Oral every 8 hours PRN anxiety  ibuprofen  Tablet. 600 milliGRAM(s) Oral every 6 hours PRN Moderate Pain (4 - 6)  LORazepam     Tablet 2 milliGRAM(s) Oral every 6 hours PRN Agitation   MEDICATIONS  (STANDING):  aspirin  chewable 81 milliGRAM(s) Oral daily  cholecalciferol 1000 Unit(s) Oral daily  DULoxetine 60 milliGRAM(s) Oral daily  levothyroxine 100 MICROGram(s) Oral daily  lurasidone 20 milliGRAM(s) Oral at bedtime  melatonin. 5 milliGRAM(s) Oral at bedtime  nicotine -  14 mG/24Hr(s) Patch 14 Patch Transdermal daily  pantoprazole    Tablet 40 milliGRAM(s) Oral before breakfast  rosuvastatin 5 milliGRAM(s) Oral at bedtime    MEDICATIONS  (PRN):  haloperidol     Tablet 5 milliGRAM(s) Oral every 6 hours PRN agitation  hydrOXYzine hydrochloride 25 milliGRAM(s) Oral every 8 hours PRN anxiety  ibuprofen  Tablet. 600 milliGRAM(s) Oral every 6 hours PRN Moderate Pain (4 - 6)  LORazepam     Tablet 2 milliGRAM(s) Oral every 6 hours PRN Agitation

## 2024-11-19 NOTE — BH INPATIENT PSYCHIATRY PROGRESS NOTE - NSBHMSETHTCONTENT_PSY_A_CORE
Describes significant feelings of being a burden to family, poor self image/Unremarkable/Ruminations/Guilt Describes significant feelings of being a burden to family, poor self image. Group therapy has helped./Unremarkable/Ruminations/Guilt

## 2024-11-19 NOTE — BH INPATIENT PSYCHIATRY PROGRESS NOTE - NSTXSUICIDINTERMD_PSY_ALL_CORE
consider augmentation of Cymbalta or tapering Cymbalta with Latuda or Lithium . psychopharmacology 15 minutes a day  Augment Cymbalta with Lurasidone. psychopharmacology 15 minutes a day

## 2024-11-19 NOTE — BH INPATIENT PSYCHIATRY PROGRESS NOTE - NSBHFUPINTERVALHXFT_PSY_A_CORE
Per staff, patient reported mild headaches overnight, took atarax, and otherwise was calm, cooperative, and slept.     Patient denies difficulty sleeping with melatonin, denies appetite concerns, and engaged significantly in conversation about proposed Lurasidone treatment. Patient scored 19 (moderate severe depression) on the PHQ-9 with reported daily depressed feeling, feeling bad about herself, difficulty concentrating, and fidgeting/constant movement. Loss of interest, low energy, increased snacking between meals, and SI were also prominent with lesser frequency, and collectively all of these symptoms have made it "difficult to very difficult" to function in her daily life. She denies any SI since arriving to unit and credits distraction by the structure and especially with groups. She describes difficulty concentrating to extent of being unable to read even half a page in a book before needing to restart. She has felt overstimulated or unable to tolerate previously normal noise/stimulus for about 3 months. This includes not being able to tolerate the sound of children in the family playing as well as various other stimuli. Education provided regarding plan for Lurasidone 20 mg for 2 days and Lurasidone 40 mg thereafter to assess safety before likely need to assess full efficacy in outpatient setting. In context of very poor reaction to last duloxetine/cymbalta tapering attempt, discussed not decreasing duloxetine at this time. Patient separately discussed history of 1 pack per day cigarette smoking with only recent self-administration of nicotine patches. She started with 21 mg, decreased to 14 mg, and then to 7 mg which she also receives in the hospital. However, she reports "cheating" and having occasional cigarettes on 7 mg, and she agreed to an increase back to 14 mg to address cravings. Discussed virtual programming options for when patient returns home and discussed with SW. No virtual programming options yet confirmed.    She encouraged team to speak with her family and provide updates.      Spoke with patient's sister Tamiko Vegas (#[phone]). She recounted her understanding of patient's current presentation. She reports generally the patient having periods of depression alternating with periods of doing well with sudden shifts between them. For the past few weeks, the patient has been significantly more depressed especially when alone, has been getting much more anxious, and has suddenly become very irritable, sometimes agitated/irrational, verbally combative, in addition to bouts of crying "all the time". She clarifies that the patient threw her cat at a wall and reportedly kicked it entirely out of character while discussing various recent stressors. Call had to be interrupted before discussion of treatment plan, and Ms. Vegas did not answer on call back.

## 2024-11-19 NOTE — BH INPATIENT PSYCHIATRY PROGRESS NOTE - NSBHCHARTREVIEWVS_PSY_A_CORE FT
Vital Signs Last 24 Hrs  T(C): 36.4 (11-19-24 @ 08:38), Max: 37 (11-18-24 @ 17:00)  T(F): 97.6 (11-19-24 @ 08:38), Max: 98.6 (11-18-24 @ 17:00)  HR: 77 (11-19-24 @ 08:38) (77 - 97)  BP: 120/70 (11-19-24 @ 08:38) (114/66 - 120/70)  BP(mean): --  RR: 18 (11-19-24 @ 08:38) (18 - 18)  SpO2: 95% (11-19-24 @ 08:38) (95% - 95%)     Vital Signs Last 24 Hrs  T(C): 37.2 (11-19-24 @ 17:02), Max: 37.2 (11-19-24 @ 17:02)  T(F): 99 (11-19-24 @ 17:02), Max: 99 (11-19-24 @ 17:02)  HR: 77 (11-19-24 @ 17:02) (77 - 77)  BP: 129/71 (11-19-24 @ 17:02) (120/70 - 129/71)  BP(mean): --  RR: 18 (11-19-24 @ 08:38) (18 - 18)  SpO2: 96% (11-19-24 @ 17:02) (95% - 96%)     Vital Signs Last 24 Hrs  T(C): 36.6 (11-20-24 @ 09:24), Max: 37.2 (11-19-24 @ 17:02)  T(F): 97.9 (11-20-24 @ 09:24), Max: 99 (11-19-24 @ 17:02)  HR: 91 (11-20-24 @ 09:24) (77 - 91)  BP: 127/70 (11-20-24 @ 09:24) (127/70 - 129/71)  BP(mean): --  RR: --  SpO2: 96% (11-20-24 @ 09:24) (96% - 96%)

## 2024-11-20 PROCEDURE — 99232 SBSQ HOSP IP/OBS MODERATE 35: CPT

## 2024-11-20 PROCEDURE — 99222 1ST HOSP IP/OBS MODERATE 55: CPT

## 2024-11-20 RX ORDER — CHOLECALCIFEROL (VITAMIN D3) 10MCG/0.25
1000 DROPS ORAL DAILY
Refills: 0 | Status: DISCONTINUED | OUTPATIENT
Start: 2024-11-20 | End: 2024-11-25

## 2024-11-20 RX ORDER — ROSUVASTATIN CALCIUM 5 MG/1
5 TABLET, FILM COATED ORAL AT BEDTIME
Refills: 0 | Status: DISCONTINUED | OUTPATIENT
Start: 2024-11-20 | End: 2024-11-25

## 2024-11-20 RX ADMIN — ACETAMINOPHEN, DIPHENHYDRAMINE HCL, PHENYLEPHRINE HCL 5 MILLIGRAM(S): 325; 25; 5 TABLET ORAL at 21:28

## 2024-11-20 RX ADMIN — Medication 60 MILLIGRAM(S): at 10:14

## 2024-11-20 RX ADMIN — LURASIDONE HYDROCHLORIDE 20 MILLIGRAM(S): 120 TABLET, FILM COATED ORAL at 21:28

## 2024-11-20 RX ADMIN — PANTOPRAZOLE SODIUM 40 MILLIGRAM(S): 40 TABLET, DELAYED RELEASE ORAL at 07:13

## 2024-11-20 RX ADMIN — Medication 81 MILLIGRAM(S): at 10:15

## 2024-11-20 RX ADMIN — Medication 100 MICROGRAM(S): at 10:14

## 2024-11-20 RX ADMIN — Medication 600 MILLIGRAM(S): at 19:21

## 2024-11-20 RX ADMIN — NICOTINE 14 PATCH: 21 PATCH, EXTENDED RELEASE TRANSDERMAL at 10:17

## 2024-11-20 NOTE — CONSULT NOTE ADULT - ASSESSMENT
Ms. Zurita is a 59 year old F, privately domiciled, unemployed on disability, PPHx of self-reported depression, anxiety, and alcohol use disorder, multiple prior psychiatric hospitalizations, no known SA, history of NSSIB (cutting), PMHx of cervical disc disease s/p spinal fusion, MVP, TIA, hypothyroid, fibromyalgia, admitted for worsening mood and suicidal ideation. Medicine consulted for w/u for rhinitis.     #Allergic rhinitis   Pt reports hx of allergic rhinitis for which she typically responds well to Cetirizine Qd. Has noticed some nasal discharge, an inflamed R nare, and fullness in her R ear. Some pain in her R submandibular lymph node, though no significant LAD can be appreciated on exam. Pt's sx reportedly worsened once admitted inpatient while not receiving antihistamine therapy. Pt tested negative for limited RVP, however, can commonly miss a myriad of other viruses that an extended/full panel can speciate. but states she has many sick contacts at home prior to her being admitted to [facility]. No fevers, chills, cough, SOB at this time. Signs and symptoms are consistent with allergic rhinitis vs viral URI.   Plan:  - Please obtain a FULL RVP nasal swab   - Can continue Cetirizine 10mg qd or alternatively, Loratadine 10mg Qd   - Please dispense: Saline Nasal Spray & Flonase Semimist nasal spray   - No indication for antibiotics at this time     Thank you for the courtesy of this consult   Discussed with Dr. Jaimes  Medicine will continue to follow

## 2024-11-20 NOTE — CONSULT NOTE ADULT - TIME BILLING
Bedside exam and interview  Reviewed vitals, labs   Discussed patient's plan of care with   Documentation of encounter  Excludes teaching and separately reported services

## 2024-11-20 NOTE — BH INPATIENT PSYCHIATRY PROGRESS NOTE - NSBHCHARTREVIEWVS_PSY_A_CORE FT
Vital Signs Last 24 Hrs  T(C): 36.6 (11-20-24 @ 09:24), Max: 37.2 (11-19-24 @ 17:02)  T(F): 97.9 (11-20-24 @ 09:24), Max: 99 (11-19-24 @ 17:02)  HR: 91 (11-20-24 @ 09:24) (77 - 91)  BP: 127/70 (11-20-24 @ 09:24) (127/70 - 129/71)  BP(mean): --  RR: --  SpO2: 96% (11-20-24 @ 09:24) (96% - 96%)

## 2024-11-20 NOTE — BH INPATIENT PSYCHIATRY PROGRESS NOTE - NSBHATTESTCOMMENTATTENDFT_PSY_A_CORE
;;11/20:  c/o sinus issues with headache.  Takes cetirizine (Zyrtec) at home for allergic rhinitis/sinusitis .  mood same; Not endorsing  suicidal or homicidal ideation intent or plans; no mention of auditory or visual hallucinations Fair to good eye contact; speech clear spontaneous and normal volume Thinking is congruent with affect; no peculiarities of thinking or language use.  WIll discuss with medicine ; possible substitute if not formulary if appropriate.  Would like to raise Latuda to 40mg a day for mood but monitor sinus issue.

## 2024-11-20 NOTE — CONSULT NOTE ADULT - SUBJECTIVE AND OBJECTIVE BOX
Patient is a 59y old  Female who presents with a chief complaint of depression       HPI:  Ms. Zurita is a 59 year old F, privately domiciled, unemployed on disability, PPHx of self-reported depression, anxiety, and alcohol use disorder, multiple prior psychiatric hospitalizations, no known SA, history of NSSIB (cutting), PMHx of cervical disc disease s/p spinal fusion, MVP, TIA, hypothyroid, fibromyalgia, admitted for worsening mood and suicidal ideation. Pt reports having hx of allergic sinusitis in the past. Has had sick contacts at home prior to hospitalization. Is a daily smoker for which she is currently receiving nicotine patches. No fevers, chills, n/v/d, abdominal pain.         Allergies  Avelox (Anaphylaxis)  Levaquin (Rash)  Intolerances      Home Medications:  CeleXA 40 mg oral tablet: 1 tab(s) orally once a day (26 Aug 2016 10:47)  Dilaudid 4 mg oral tablet: 1 tab(s) orally every 4 hours, As Needed (26 Aug 2016 10:47)  levothyroxine 100 mcg (0.1 mg) oral capsule: 1 cap(s) orally once a day (26 Aug 2016 10:47)  Lyrica 50 mg oral capsule: 1 cap(s) orally 2 times a day (26 Aug 2016 10:47)  Valium 5 mg oral tablet: 1 tab(s) orally every 4 hours, As Needed (26 Aug 2016 10:47)      SOCIAL HX:     Smoking          ETOH/Illicit drugs          Occupation    PAST MEDICAL & SURGICAL HISTORY:  Depression (emotion)      Hypothyroid      Cervical disc disease      Fibromyalgia      Constipation due to pain medication      Vaso vagal episode      TIA (transient ischemic attack)      MVP (mitral valve prolapse)      Lumbar back pain      S/P cervical spinal fusion  times 3      S/P laminectomy      S/P shoulder surgery  labral repair      S/P appendectomy          FAMILY HISTORY:  Family history of stroke (Mother)    Family history of colon cancer (Mother)    :    No known cardiovascular or pulmonary family history     ROS:  See HPI     PHYSICAL EXAM    ICU Vital Signs Last 24 Hrs  T(C): 37 (20 Nov 2024 16:14), Max: 37 (20 Nov 2024 16:14)  T(F): 98.6 (20 Nov 2024 16:14), Max: 98.6 (20 Nov 2024 16:14)  HR: 86 (20 Nov 2024 16:14) (86 - 91)  BP: 97/65 (20 Nov 2024 16:14) (97/65 - 127/70)  BP(mean): --  ABP: --  ABP(mean): --  RR: --  SpO2: 98% (20 Nov 2024 16:14) (96% - 98%)    O2 Parameters below as of 20 Nov 2024 16:14  Patient On (Oxygen Delivery Method): room air          Physical Examination:  General: Alert and oriented x 3. No acute distress. Well-nourished.  Eyes: EOMI. Anicteric.  HEENT: Moist mucous membranes. No scleral icterus. No cervical lymphadenopathy. + Mild frontal sinus tenderness with percussion   Lungs: Clear to auscultation bilaterally. No accessory muscle use.  Cardiovascular: Regular rate and rhythm. No murmur. No JVD.  Abdomen: Soft, non-tender and non-distended. No palpable masses.  Extremities: No edema. Non-tender.  Skin: No rashes or lesions. Warm.  Neurologic: No focal neurological deficits. CN II-XII grossly intact, but not individually tested.  Psychiatric: Cooperative. Appropriate mood and affect.          LABS:                                                                                                                                                                                                                                                                               CXR:    ECHO:    MEDICATIONS  (STANDING):  aspirin  chewable 81 milliGRAM(s) Oral daily  cholecalciferol 1000 Unit(s) Oral daily  DULoxetine 60 milliGRAM(s) Oral daily  levothyroxine 100 MICROGram(s) Oral daily  lurasidone 20 milliGRAM(s) Oral at bedtime  melatonin. 5 milliGRAM(s) Oral at bedtime  nicotine -  14 mG/24Hr(s) Patch 14 Patch Transdermal daily  pantoprazole    Tablet 40 milliGRAM(s) Oral before breakfast  rosuvastatin 5 milliGRAM(s) Oral at bedtime    MEDICATIONS  (PRN):  haloperidol     Tablet 5 milliGRAM(s) Oral every 6 hours PRN agitation  hydrOXYzine hydrochloride 25 milliGRAM(s) Oral every 8 hours PRN anxiety  ibuprofen  Tablet. 600 milliGRAM(s) Oral every 6 hours PRN Moderate Pain (4 - 6)  LORazepam     Tablet 2 milliGRAM(s) Oral every 6 hours PRN Agitation

## 2024-11-20 NOTE — CONSULT NOTE ADULT - ATTENDING COMMENTS
Ms. Zurita is a 59 year old F, privately domiciled, unemployed on disability, PPHx of self-reported depression, anxiety, and alcohol use disorder, multiple prior psychiatric hospitalizations, no known SA, history of NSSIB (cutting), PMHx of cervical disc disease s/p spinal fusion, MVP, TIA, hypothyroid, fibromyalgia, former smoker/ hx allergic/rhinitis/sinusitis admitted for worsening mood and suicidal ideation. Medicine consulted for w/u for rhinitis.     Pt seen with , interviewed and examined. Pt reports sinus HA, nasal congestion and had use zyrtec, intranasal spray and mupirocin ointment.     #Allergic rhinitis   - obtain full RVP  - start anti-histamine i.e. Cetirizine 10mg qHS or Loratatine 10mg daily   - Intranasal steroid spray i.e. Flonase and saline nasal spray  - Mupirocin ointment b/l nostril daily   - no need for oral abx   - no evidence of drug allergy here    Thank you for the courtesy of this consult   Medicine will continue to follow Ms. Zurita is a 59 year old F, privately domiciled, unemployed on disability, PPHx of self-reported depression, anxiety, and alcohol use disorder, multiple prior psychiatric hospitalizations, no known SA, history of NSSIB (cutting), PMHx of cervical disc disease s/p spinal fusion, MVP, TIA, hypothyroid, fibromyalgia, former smoker/ hx allergic/rhinitis/sinusitis admitted for worsening mood and suicidal ideation. Medicine consulted for w/u for rhinitis.     Pt seen with , interviewed and examined. Pt reports sinus HA, nasal congestion and had use zyrtec, intranasal spray and mupirocin ointment.     #Allergic rhinitis   - obtain full RVP  - start anti-histamine i.e. Cetirizine 10mg qHS or Loratadine 10mg daily   - Intranasal steroid spray i.e. Flonase and saline nasal spray  - APAP prn sinus headaches  - Mupirocin ointment b/l nostril daily   - no need for oral abx   - no evidence of drug allergy here    Thank you for the courtesy of this consult   Medicine will continue to follow

## 2024-11-20 NOTE — BH INPATIENT PSYCHIATRY PROGRESS NOTE - NSBHMSETHTCONTENT_PSY_A_CORE
Describes significant feelings of being a burden to family, poor self image, acute anxiety when asking for even small things like towels for showering./Ruminations/Guilt

## 2024-11-20 NOTE — BH INPATIENT PSYCHIATRY PROGRESS NOTE - NSBHMETABOLIC_PSY_ALL_CORE_FT
BMI: BMI (kg/m2): 21.8 (11-15-24 @ 14:48)  HbA1c:   Glucose:   BP: 127/70 (11-20-24 @ 09:24) (114/66 - 129/71)Vital Signs Last 24 Hrs  T(C): 36.6 (11-20-24 @ 09:24), Max: 37.2 (11-19-24 @ 17:02)  T(F): 97.9 (11-20-24 @ 09:24), Max: 99 (11-19-24 @ 17:02)  HR: 91 (11-20-24 @ 09:24) (77 - 91)  BP: 127/70 (11-20-24 @ 09:24) (127/70 - 129/71)  BP(mean): --  RR: --  SpO2: 96% (11-20-24 @ 09:24) (96% - 96%)      Lipid Panel:

## 2024-11-20 NOTE — BH INPATIENT PSYCHIATRY PROGRESS NOTE - NSBHFUPINTERVALHXFT_PSY_A_CORE
Overnight staff report this patient exhibiting constant anxiety though overall being somewhat brighter than before. She took atarax prn as well as her scheduled medication in the evening and otherwise was calm and slept.     She reports sleeping adequately as usual and denies changes in her appetite. She states that she feels her energy level is lower this morning compared to before but denies any other noticeable changes. She was mildly fidgeting with her hands and talked about her overthinking and difficulty asking for help from others. She continues to deny any SI while on the unit with groups. She describes having started talk therapy outpatient a few months ago with therapist Alok Dinh and looks forward to continuing to work with her.    Medication reconciliation was completed to check patient's medical medications. Patient goes to the [facility] on [facility].   The patient has been taking Pantoprazole 40 mg daily, Rosuvastatin 5 mg daily, Cymbalta 60 mg, Gabapentin 600 mg TID, and Levothyroxine 100 ug daily in addition to the previously prescribed Buspirone 15 mg q12h. Patient requests and will be given appropriate equivalent statins as well as vitamin supplementation for Vit D. Patient typically takes fiber supplements but is adequately supplemented here with prune juice.    She discussed her previous alcohol use, reporting previous 10+ drink per day to the point of blackout alcohol consumption until 6 months ago. She denies any history of withdrawal symptoms or general history of vomiting with usual drinking. She describes feeling more clearheaded, better able to feel and identify her emotions, and focus since stopping alcohol use. She continues to appreciate AA meetings but has not been in medication assisted substance use treatment.     Education provided regarding bipolar depression, lurasidone, and MDD. Patient denies any adverse effects so far from Lurasidone, and denies AVH, SI, and HI as listed above.

## 2024-11-20 NOTE — BH INPATIENT PSYCHIATRY PROGRESS NOTE - CURRENT MEDICATION
MEDICATIONS  (STANDING):  aspirin  chewable 81 milliGRAM(s) Oral daily  DULoxetine 60 milliGRAM(s) Oral daily  levothyroxine 100 MICROGram(s) Oral daily  lurasidone 20 milliGRAM(s) Oral at bedtime  melatonin. 5 milliGRAM(s) Oral at bedtime  nicotine -  14 mG/24Hr(s) Patch 14 Patch Transdermal daily  pantoprazole    Tablet 40 milliGRAM(s) Oral before breakfast    MEDICATIONS  (PRN):  haloperidol     Tablet 5 milliGRAM(s) Oral every 6 hours PRN agitation  hydrOXYzine hydrochloride 25 milliGRAM(s) Oral every 8 hours PRN anxiety  ibuprofen  Tablet. 600 milliGRAM(s) Oral every 6 hours PRN Moderate Pain (4 - 6)  LORazepam     Tablet 2 milliGRAM(s) Oral every 6 hours PRN Agitation   MEDICATIONS  (STANDING):  aspirin  chewable 81 milliGRAM(s) Oral daily  cholecalciferol 1000 Unit(s) Oral daily  DULoxetine 60 milliGRAM(s) Oral daily  levothyroxine 100 MICROGram(s) Oral daily  lurasidone 20 milliGRAM(s) Oral at bedtime  melatonin. 5 milliGRAM(s) Oral at bedtime  nicotine -  14 mG/24Hr(s) Patch 14 Patch Transdermal daily  pantoprazole    Tablet 40 milliGRAM(s) Oral before breakfast  rosuvastatin 5 milliGRAM(s) Oral at bedtime    MEDICATIONS  (PRN):  haloperidol     Tablet 5 milliGRAM(s) Oral every 6 hours PRN agitation  hydrOXYzine hydrochloride 25 milliGRAM(s) Oral every 8 hours PRN anxiety  ibuprofen  Tablet. 600 milliGRAM(s) Oral every 6 hours PRN Moderate Pain (4 - 6)  LORazepam     Tablet 2 milliGRAM(s) Oral every 6 hours PRN Agitation

## 2024-11-21 PROCEDURE — 99231 SBSQ HOSP IP/OBS SF/LOW 25: CPT

## 2024-11-21 PROCEDURE — 99232 SBSQ HOSP IP/OBS MODERATE 35: CPT

## 2024-11-21 RX ORDER — LORAZEPAM 2 MG/1
2 TABLET ORAL EVERY 6 HOURS
Refills: 0 | Status: DISCONTINUED | OUTPATIENT
Start: 2024-11-21 | End: 2024-11-25

## 2024-11-21 RX ORDER — CETIRIZINE HYDROCHLORIDE 10 MG/1
5 TABLET ORAL DAILY
Refills: 0 | Status: DISCONTINUED | OUTPATIENT
Start: 2024-11-21 | End: 2024-11-25

## 2024-11-21 RX ORDER — SODIUM CHLORIDE 0.65 %
1 AEROSOL, SPRAY (ML) NASAL
Refills: 0 | Status: DISCONTINUED | OUTPATIENT
Start: 2024-11-21 | End: 2024-11-25

## 2024-11-21 RX ADMIN — ROSUVASTATIN CALCIUM 5 MILLIGRAM(S): 5 TABLET, FILM COATED ORAL at 21:50

## 2024-11-21 RX ADMIN — LORAZEPAM 2 MILLIGRAM(S): 2 TABLET ORAL at 11:38

## 2024-11-21 RX ADMIN — Medication 60 MILLIGRAM(S): at 10:05

## 2024-11-21 RX ADMIN — LURASIDONE HYDROCHLORIDE 40 MILLIGRAM(S): 120 TABLET, FILM COATED ORAL at 21:49

## 2024-11-21 RX ADMIN — ACETAMINOPHEN, DIPHENHYDRAMINE HCL, PHENYLEPHRINE HCL 5 MILLIGRAM(S): 325; 25; 5 TABLET ORAL at 21:49

## 2024-11-21 RX ADMIN — Medication 81 MILLIGRAM(S): at 10:05

## 2024-11-21 RX ADMIN — Medication 100 MICROGRAM(S): at 10:05

## 2024-11-21 RX ADMIN — HYDROXYZINE HYDROCHLORIDE 25 MILLIGRAM(S): 25 TABLET, FILM COATED ORAL at 21:49

## 2024-11-21 RX ADMIN — NICOTINE 1 PATCH: 21 PATCH, EXTENDED RELEASE TRANSDERMAL at 10:07

## 2024-11-21 RX ADMIN — PANTOPRAZOLE SODIUM 40 MILLIGRAM(S): 40 TABLET, DELAYED RELEASE ORAL at 07:31

## 2024-11-21 RX ADMIN — Medication 1000 UNIT(S): at 10:05

## 2024-11-21 NOTE — BH INPATIENT PSYCHIATRY PROGRESS NOTE - NSBHATTESTCOMMENTATTENDFT_PSY_A_CORE
;;11/20:  c/o sinus issues with headache.  Takes cetirizine (Zyrtec) at home for allergic rhinitis/sinusitis .  mood same; Not endorsing  suicidal or homicidal ideation intent or plans; no mention of auditory or visual hallucinations Fair to good eye contact; speech clear spontaneous and normal volume Thinking is congruent with affect; no peculiarities of thinking or language use.  WIll discuss with medicine ; possible substitute if not formulary if appropriate.  Would like to raise Latuda to 40mg a day for mood but monitor sinus issue.  ;;11/21: mood somewhat worse than yesterday;  seen by medicine Zyrtect [facility] saline for nose ordered;  Not endorsing  suicidal or homicidal ideation intent or plans; no mention of auditory or visual hallucinations .  Latuda raised to 40mg a day for bipoalr depressioin    Current medications aspirin  chewable 81 milliGRAM(s) Oral daily  cetirizine 5 milliGRAM(s) Oral daily for allergies  cholecalciferol 1000 Unit(s) Oral daily  DULoxetine 60 milliGRAM(s) Oral daily for depression not yet tapering .   haloperidol     Tablet 5 milliGRAM(s) Oral every 6 hours PRN  hydrOXYzine hydrochloride 25 milliGRAM(s) Oral every 8 hours PRN  ibuprofen  Tablet. 600 milliGRAM(s) Oral every 6 hours PRN  levothyroxine 100 MICROGram(s) Oral daily for hypothryoidism  LORazepam     Tablet 2 milliGRAM(s) Oral every 6 hours PRN  lurasidone 40 milliGRAM(s) Oral at bedtime for bipolar depression  melatonin. 5 milliGRAM(s) Oral at bedtime for insomnia   nicotine -  14 mG/24Hr(s) Patch 14 Patch Transdermal daily  pantoprazole    Tablet 40 milliGRAM(s) Oral before breakfast for GERD  rosuvastatin 5 milliGRAM(s) Oral at bedtime for HLD  sodium chloride 0.65% Nasal 1 Spray(s) Both Nostrils two times a day PRN for nasal congestion

## 2024-11-21 NOTE — PROGRESS NOTE ADULT - ASSESSMENT
Ms. Zurita is a 59 year old F, privately domiciled, unemployed on disability, PPHx of self-reported depression, anxiety, and alcohol use disorder, multiple prior psychiatric hospitalizations, no known SA, history of NSSIB (cutting), PMHx of cervical disc disease s/p spinal fusion, MVP, TIA, hypothyroid, fibromyalgia, admitted for worsening mood and suicidal ideation. Medicine consulted for w/u for rhinitis.     #Allergic rhinitis   Pt reports hx of allergic rhinitis for which she typically responds well to Cetirizine Qd. Has noticed some nasal discharge, an inflamed R nare, and fullness in her R ear. Some pain in her R submandibular lymph node, though no significant LAD can be appreciated on exam. Pt's sx reportedly worsened once admitted inpatient while not receiving antihistamine therapy. Pt tested negative for limited RVP, however, can commonly miss a myriad of other viruses that an extended/full panel can speciate. but states she has many sick contacts at home prior to her being admitted to [facility]. No fevers, chills, cough, SOB at this time. Signs and symptoms are consistent with allergic rhinitis vs viral URI.   Plan:  - Please obtain a FULL RVP nasal swab   - Can continue Cetirizine 10mg qd or alternatively, Loratadine 10mg Qd   - Please dispense: Saline Nasal Spray & Flonase Semimist nasal spray   - No indication for antibiotics at this time     Thank you for the courtesy of this consult   Discussed with Dr. Connors  Medicine will continue to follow

## 2024-11-21 NOTE — BH INPATIENT PSYCHIATRY PROGRESS NOTE - CURRENT MEDICATION
MEDICATIONS  (STANDING):  aspirin  chewable 81 milliGRAM(s) Oral daily  cholecalciferol 1000 Unit(s) Oral daily  DULoxetine 60 milliGRAM(s) Oral daily  levothyroxine 100 MICROGram(s) Oral daily  lurasidone 40 milliGRAM(s) Oral at bedtime  melatonin. 5 milliGRAM(s) Oral at bedtime  nicotine -  14 mG/24Hr(s) Patch 14 Patch Transdermal daily  pantoprazole    Tablet 40 milliGRAM(s) Oral before breakfast  rosuvastatin 5 milliGRAM(s) Oral at bedtime    MEDICATIONS  (PRN):  haloperidol     Tablet 5 milliGRAM(s) Oral every 6 hours PRN agitation  hydrOXYzine hydrochloride 25 milliGRAM(s) Oral every 8 hours PRN anxiety  ibuprofen  Tablet. 600 milliGRAM(s) Oral every 6 hours PRN Moderate Pain (4 - 6)  LORazepam     Tablet 2 milliGRAM(s) Oral every 6 hours PRN Agitation   MEDICATIONS  (STANDING):  aspirin  chewable 81 milliGRAM(s) Oral daily  cetirizine 5 milliGRAM(s) Oral daily  cholecalciferol 1000 Unit(s) Oral daily  DULoxetine 60 milliGRAM(s) Oral daily  levothyroxine 100 MICROGram(s) Oral daily  lurasidone 40 milliGRAM(s) Oral at bedtime  melatonin. 5 milliGRAM(s) Oral at bedtime  nicotine -  14 mG/24Hr(s) Patch 14 Patch Transdermal daily  pantoprazole    Tablet 40 milliGRAM(s) Oral before breakfast  rosuvastatin 5 milliGRAM(s) Oral at bedtime    MEDICATIONS  (PRN):  haloperidol     Tablet 5 milliGRAM(s) Oral every 6 hours PRN agitation  hydrOXYzine hydrochloride 25 milliGRAM(s) Oral every 8 hours PRN anxiety  ibuprofen  Tablet. 600 milliGRAM(s) Oral every 6 hours PRN Moderate Pain (4 - 6)  LORazepam     Tablet 2 milliGRAM(s) Oral every 6 hours PRN Agitation  sodium chloride 0.65% Nasal 1 Spray(s) Both Nostrils two times a day PRN Nasal Congestion

## 2024-11-21 NOTE — BH INPATIENT PSYCHIATRY PROGRESS NOTE - OTHER
No gross deficits but patient endorses generally inconsistent memory. On further questioning, she denies misplacing objects, forgetting important dates or events, or other general memory issues. She emphasizes generally having anxiety that she will forget important questions or information when talking with health care providers. No gross deficits Fidgeting with hands Fidgeting with hands, tremulous at times when she becomes emotional

## 2024-11-21 NOTE — BH INPATIENT PSYCHIATRY PROGRESS NOTE - NSBHMETABOLIC_PSY_ALL_CORE_FT
BMI: BMI (kg/m2): 21.8 (11-15-24 @ 14:48)  HbA1c:   Glucose:   BP: 129/74 (11-21-24 @ 09:00) (97/65 - 129/74)Vital Signs Last 24 Hrs  T(C): 36.9 (11-21-24 @ 09:00), Max: 37 (11-20-24 @ 16:14)  T(F): 98.4 (11-21-24 @ 09:00), Max: 98.6 (11-20-24 @ 16:14)  HR: 85 (11-21-24 @ 09:00) (85 - 86)  BP: 129/74 (11-21-24 @ 09:00) (97/65 - 129/74)  BP(mean): --  RR: 18 (11-21-24 @ 09:00) (18 - 18)  SpO2: 95% (11-21-24 @ 09:00) (95% - 98%)      Lipid Panel:  BMI: BMI (kg/m2): 21.8 (11-15-24 @ 14:48)  HbA1c:   Glucose:   BP: 123/74 (11-21-24 @ 17:00) (97/65 - 129/74)Vital Signs Last 24 Hrs  T(C): 37.3 (11-21-24 @ 17:00), Max: 37.3 (11-21-24 @ 17:00)  T(F): 99.2 (11-21-24 @ 17:00), Max: 99.2 (11-21-24 @ 17:00)  HR: 90 (11-21-24 @ 17:00) (85 - 90)  BP: 123/74 (11-21-24 @ 17:00) (123/74 - 129/74)  BP(mean): --  RR: 18 (11-21-24 @ 17:00) (18 - 18)  SpO2: 96% (11-21-24 @ 17:00) (95% - 96%)      Lipid Panel:
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
## 2024-11-21 NOTE — BH INPATIENT PSYCHIATRY PROGRESS NOTE - PRN MEDS
MEDICATIONS  (PRN):  haloperidol     Tablet 5 milliGRAM(s) Oral every 6 hours PRN agitation  hydrOXYzine hydrochloride 25 milliGRAM(s) Oral every 8 hours PRN anxiety  ibuprofen  Tablet. 600 milliGRAM(s) Oral every 6 hours PRN Moderate Pain (4 - 6)  LORazepam     Tablet 2 milliGRAM(s) Oral every 6 hours PRN Agitation   MEDICATIONS  (PRN):  haloperidol     Tablet 5 milliGRAM(s) Oral every 6 hours PRN agitation  hydrOXYzine hydrochloride 25 milliGRAM(s) Oral every 8 hours PRN anxiety  ibuprofen  Tablet. 600 milliGRAM(s) Oral every 6 hours PRN Moderate Pain (4 - 6)  LORazepam     Tablet 2 milliGRAM(s) Oral every 6 hours PRN Agitation  sodium chloride 0.65% Nasal 1 Spray(s) Both Nostrils two times a day PRN Nasal Congestion

## 2024-11-21 NOTE — BH INPATIENT PSYCHIATRY PROGRESS NOTE - NSBHCHARTREVIEWVS_PSY_A_CORE FT
Vital Signs Last 24 Hrs  T(C): 36.9 (11-21-24 @ 09:00), Max: 37 (11-20-24 @ 16:14)  T(F): 98.4 (11-21-24 @ 09:00), Max: 98.6 (11-20-24 @ 16:14)  HR: 85 (11-21-24 @ 09:00) (85 - 86)  BP: 129/74 (11-21-24 @ 09:00) (97/65 - 129/74)  BP(mean): --  RR: 18 (11-21-24 @ 09:00) (18 - 18)  SpO2: 95% (11-21-24 @ 09:00) (95% - 98%)     Vital Signs Last 24 Hrs  T(C): 37.3 (11-21-24 @ 17:00), Max: 37.3 (11-21-24 @ 17:00)  T(F): 99.2 (11-21-24 @ 17:00), Max: 99.2 (11-21-24 @ 17:00)  HR: 90 (11-21-24 @ 17:00) (85 - 90)  BP: 123/74 (11-21-24 @ 17:00) (123/74 - 129/74)  BP(mean): --  RR: 18 (11-21-24 @ 17:00) (18 - 18)  SpO2: 96% (11-21-24 @ 17:00) (95% - 96%)

## 2024-11-21 NOTE — PROGRESS NOTE ADULT - SUBJECTIVE AND OBJECTIVE BOX
CC: Patient is a 59y old  Female who presents with a chief complaint of MDD    INTERVAL EVENTS: CRISTINA  SUBJECTIVE / INTERVAL HPI: Patient seen and examined at bedside.   ROS: negative unless otherwise stated above.    VITAL SIGNS:  Vital Signs Last 24 Hrs  T(C): 37 (20 Nov 2024 16:14), Max: 37 (20 Nov 2024 16:14)  T(F): 98.6 (20 Nov 2024 16:14), Max: 98.6 (20 Nov 2024 16:14)  HR: 86 (20 Nov 2024 16:14) (86 - 91)  BP: 97/65 (20 Nov 2024 16:14) (97/65 - 127/70)  BP(mean): --  RR: --  SpO2: 98% (20 Nov 2024 16:14) (96% - 98%)    Parameters below as of 20 Nov 2024 16:14  Patient On (Oxygen Delivery Method): room air            PHYSICAL EXAM:  General: Alert and oriented x 3. No acute distress. Well-nourished.  Eyes: EOMI. Anicteric.  HEENT: Moist mucous membranes. No scleral icterus. No cervical lymphadenopathy. + Mild frontal sinus tenderness with percussion   Lungs: Clear to auscultation bilaterally. No accessory muscle use.  Cardiovascular: Regular rate and rhythm. No murmur. No JVD.  Abdomen: Soft, non-tender and non-distended. No palpable masses.  Extremities: No edema. Non-tender.  Skin: No rashes or lesions. Warm.  Neurologic: No focal neurological deficits. CN II-XII grossly intact, but not individually tested.  Psychiatric: Cooperative. Appropriate mood and affect.    MEDICATIONS:  MEDICATIONS  (STANDING):  aspirin  chewable 81 milliGRAM(s) Oral daily  cholecalciferol 1000 Unit(s) Oral daily  DULoxetine 60 milliGRAM(s) Oral daily  levothyroxine 100 MICROGram(s) Oral daily  lurasidone 40 milliGRAM(s) Oral at bedtime  melatonin. 5 milliGRAM(s) Oral at bedtime  nicotine -  14 mG/24Hr(s) Patch 14 Patch Transdermal daily  pantoprazole    Tablet 40 milliGRAM(s) Oral before breakfast  rosuvastatin 5 milliGRAM(s) Oral at bedtime    MEDICATIONS  (PRN):  haloperidol     Tablet 5 milliGRAM(s) Oral every 6 hours PRN agitation  hydrOXYzine hydrochloride 25 milliGRAM(s) Oral every 8 hours PRN anxiety  ibuprofen  Tablet. 600 milliGRAM(s) Oral every 6 hours PRN Moderate Pain (4 - 6)  LORazepam     Tablet 2 milliGRAM(s) Oral every 6 hours PRN Agitation      ALLERGIES:  Allergies    Avelox (Anaphylaxis)  Levaquin (Rash)    Intolerances        LABS:              CAPILLARY BLOOD GLUCOSE          RADIOLOGY & ADDITIONAL TESTS: Reviewed. CC: Patient is a 59y old  Female who presents with a chief complaint of MDD    INTERVAL EVENTS: CRISTINA  SUBJECTIVE / INTERVAL HPI: Patient seen and examined at bedside. Improving congestion with Cetirizine.   ROS: negative unless otherwise stated above.    VITAL SIGNS:  Vital Signs Last 24 Hrs  T(C): 37 (20 Nov 2024 16:14), Max: 37 (20 Nov 2024 16:14)  T(F): 98.6 (20 Nov 2024 16:14), Max: 98.6 (20 Nov 2024 16:14)  HR: 86 (20 Nov 2024 16:14) (86 - 91)  BP: 97/65 (20 Nov 2024 16:14) (97/65 - 127/70)  BP(mean): --  RR: --  SpO2: 98% (20 Nov 2024 16:14) (96% - 98%)    Parameters below as of 20 Nov 2024 16:14  Patient On (Oxygen Delivery Method): room air            PHYSICAL EXAM:  General: Alert and oriented x 3. No acute distress. Well-nourished.  Eyes: EOMI. Anicteric.  HEENT: Moist mucous membranes. No scleral icterus. No cervical lymphadenopathy. + Mild frontal sinus tenderness with percussion   Lungs: Clear to auscultation bilaterally. No accessory muscle use.  Cardiovascular: Regular rate and rhythm. No murmur. No JVD.  Abdomen: Soft, non-tender and non-distended. No palpable masses.  Extremities: No edema. Non-tender.  Skin: No rashes or lesions. Warm.  Neurologic: No focal neurological deficits. CN II-XII grossly intact, but not individually tested.  Psychiatric: Cooperative. Appropriate mood and affect.    MEDICATIONS:  MEDICATIONS  (STANDING):  aspirin  chewable 81 milliGRAM(s) Oral daily  cholecalciferol 1000 Unit(s) Oral daily  DULoxetine 60 milliGRAM(s) Oral daily  levothyroxine 100 MICROGram(s) Oral daily  lurasidone 40 milliGRAM(s) Oral at bedtime  melatonin. 5 milliGRAM(s) Oral at bedtime  nicotine -  14 mG/24Hr(s) Patch 14 Patch Transdermal daily  pantoprazole    Tablet 40 milliGRAM(s) Oral before breakfast  rosuvastatin 5 milliGRAM(s) Oral at bedtime    MEDICATIONS  (PRN):  haloperidol     Tablet 5 milliGRAM(s) Oral every 6 hours PRN agitation  hydrOXYzine hydrochloride 25 milliGRAM(s) Oral every 8 hours PRN anxiety  ibuprofen  Tablet. 600 milliGRAM(s) Oral every 6 hours PRN Moderate Pain (4 - 6)  LORazepam     Tablet 2 milliGRAM(s) Oral every 6 hours PRN Agitation      ALLERGIES:  Allergies    Avelox (Anaphylaxis)  Levaquin (Rash)    Intolerances        LABS:              CAPILLARY BLOOD GLUCOSE          RADIOLOGY & ADDITIONAL TESTS: Reviewed.

## 2024-11-22 LAB
HCOV PNL SPEC NAA+PROBE: DETECTED
RAPID RVP RESULT: DETECTED
SARS-COV-2 RNA SPEC QL NAA+PROBE: SIGNIFICANT CHANGE UP

## 2024-11-22 PROCEDURE — 99232 SBSQ HOSP IP/OBS MODERATE 35: CPT

## 2024-11-22 PROCEDURE — 99231 SBSQ HOSP IP/OBS SF/LOW 25: CPT

## 2024-11-22 RX ADMIN — CETIRIZINE HYDROCHLORIDE 5 MILLIGRAM(S): 10 TABLET ORAL at 10:22

## 2024-11-22 RX ADMIN — HYDROXYZINE HYDROCHLORIDE 25 MILLIGRAM(S): 25 TABLET, FILM COATED ORAL at 13:30

## 2024-11-22 RX ADMIN — PANTOPRAZOLE SODIUM 40 MILLIGRAM(S): 40 TABLET, DELAYED RELEASE ORAL at 07:03

## 2024-11-22 RX ADMIN — Medication 600 MILLIGRAM(S): at 19:29

## 2024-11-22 RX ADMIN — Medication 1000 UNIT(S): at 10:22

## 2024-11-22 RX ADMIN — Medication 60 MILLIGRAM(S): at 10:20

## 2024-11-22 RX ADMIN — Medication 600 MILLIGRAM(S): at 10:20

## 2024-11-22 RX ADMIN — NICOTINE 1 PATCH: 21 PATCH, EXTENDED RELEASE TRANSDERMAL at 10:19

## 2024-11-22 RX ADMIN — LURASIDONE HYDROCHLORIDE 40 MILLIGRAM(S): 120 TABLET, FILM COATED ORAL at 21:47

## 2024-11-22 RX ADMIN — ACETAMINOPHEN, DIPHENHYDRAMINE HCL, PHENYLEPHRINE HCL 5 MILLIGRAM(S): 325; 25; 5 TABLET ORAL at 21:47

## 2024-11-22 RX ADMIN — NICOTINE 14 PATCH: 21 PATCH, EXTENDED RELEASE TRANSDERMAL at 10:20

## 2024-11-22 RX ADMIN — Medication 100 MICROGRAM(S): at 10:21

## 2024-11-22 RX ADMIN — Medication 81 MILLIGRAM(S): at 10:20

## 2024-11-22 RX ADMIN — ROSUVASTATIN CALCIUM 5 MILLIGRAM(S): 5 TABLET, FILM COATED ORAL at 21:47

## 2024-11-22 RX ADMIN — HYDROXYZINE HYDROCHLORIDE 25 MILLIGRAM(S): 25 TABLET, FILM COATED ORAL at 21:47

## 2024-11-22 RX ADMIN — Medication 600 MILLIGRAM(S): at 11:09

## 2024-11-22 NOTE — BH INPATIENT PSYCHIATRY PROGRESS NOTE - NSBHMETABOLIC_PSY_ALL_CORE_FT
BMI: BMI (kg/m2): 21.8 (11-15-24 @ 14:48)  HbA1c:   Glucose:   BP: 128/73 (11-22-24 @ 10:23) (97/65 - 129/74)Vital Signs Last 24 Hrs  T(C): 37.2 (11-22-24 @ 10:23), Max: 37.3 (11-21-24 @ 17:00)  T(F): 99 (11-22-24 @ 10:23), Max: 99.2 (11-21-24 @ 17:00)  HR: 94 (11-22-24 @ 10:23) (90 - 94)  BP: 128/73 (11-22-24 @ 10:23) (123/74 - 128/73)  BP(mean): --  RR: 18 (11-21-24 @ 17:00) (18 - 18)  SpO2: 96% (11-22-24 @ 10:23) (96% - 96%)      Lipid Panel:  BMI: BMI (kg/m2): 21.8 (11-15-24 @ 14:48)  HbA1c:   Glucose:   BP: 124/76 (11-24-24 @ 09:56) (117/71 - 134/76)Vital Signs Last 24 Hrs  T(C): 36.9 (11-24-24 @ 09:56), Max: 37.1 (11-23-24 @ 16:45)  T(F): 98.5 (11-24-24 @ 09:56), Max: 98.7 (11-23-24 @ 16:45)  HR: 76 (11-24-24 @ 09:56) (76 - 77)  BP: 124/76 (11-24-24 @ 09:56) (124/76 - 134/76)  BP(mean): --  RR: 18 (11-24-24 @ 09:56) (18 - 18)  SpO2: 94% (11-24-24 @ 09:56) (94% - 97%)      Lipid Panel:

## 2024-11-22 NOTE — BH INPATIENT PSYCHIATRY PROGRESS NOTE - NSCGIIMPROVESX_PSY_ALL_CORE
3 = Minimally improved - slightly better with little or no clinically meaningful reduction of symptoms.  Represents very little change in basic clinical status, level of care, or functional capacity.
4 = No change - symptoms remain essentially unchanged
3 = Minimally improved - slightly better with little or no clinically meaningful reduction of symptoms.  Represents very little change in basic clinical status, level of care, or functional capacity.
4 = No change - symptoms remain essentially unchanged
2 = Much improved - notably better with signficant reduction of symptoms; increase in the level of functioning but some symptoms remain
3 = Minimally improved - slightly better with little or no clinically meaningful reduction of symptoms.  Represents very little change in basic clinical status, level of care, or functional capacity.

## 2024-11-22 NOTE — BH INPATIENT PSYCHIATRY PROGRESS NOTE - OTHER
No gross deficits but patient endorses generally inconsistent memory. On further questioning, she denies misplacing objects, forgetting important dates or events, or other general memory issues. She emphasizes generally having anxiety that she will forget important questions or information when talking with health care providers. No gross deficits Fidgeting with hands, tremulous at times when she becomes emotional

## 2024-11-22 NOTE — BH INPATIENT PSYCHIATRY PROGRESS NOTE - NSBHCHARTREVIEWVS_PSY_A_CORE FT
Vital Signs Last 24 Hrs  T(C): 37.2 (11-22-24 @ 10:23), Max: 37.3 (11-21-24 @ 17:00)  T(F): 99 (11-22-24 @ 10:23), Max: 99.2 (11-21-24 @ 17:00)  HR: 94 (11-22-24 @ 10:23) (90 - 94)  BP: 128/73 (11-22-24 @ 10:23) (123/74 - 128/73)  BP(mean): --  RR: 18 (11-21-24 @ 17:00) (18 - 18)  SpO2: 96% (11-22-24 @ 10:23) (96% - 96%)     Vital Signs Last 24 Hrs  T(C): 36.9 (11-24-24 @ 09:56), Max: 37.1 (11-23-24 @ 16:45)  T(F): 98.5 (11-24-24 @ 09:56), Max: 98.7 (11-23-24 @ 16:45)  HR: 76 (11-24-24 @ 09:56) (76 - 77)  BP: 124/76 (11-24-24 @ 09:56) (124/76 - 134/76)  BP(mean): --  RR: 18 (11-24-24 @ 09:56) (18 - 18)  SpO2: 94% (11-24-24 @ 09:56) (94% - 97%)

## 2024-11-22 NOTE — BH INPATIENT PSYCHIATRY PROGRESS NOTE - PRN MEDS
MEDICATIONS  (PRN):  haloperidol     Tablet 5 milliGRAM(s) Oral every 6 hours PRN agitation  hydrOXYzine hydrochloride 25 milliGRAM(s) Oral every 8 hours PRN anxiety  ibuprofen  Tablet. 600 milliGRAM(s) Oral every 6 hours PRN Moderate Pain (4 - 6)  LORazepam     Tablet 2 milliGRAM(s) Oral every 6 hours PRN Agitation  sodium chloride 0.65% Nasal 1 Spray(s) Both Nostrils two times a day PRN Nasal Congestion   MEDICATIONS  (PRN):  benzocaine/menthol Lozenge 1 Lozenge Oral three times a day PRN dry throat  haloperidol     Tablet 5 milliGRAM(s) Oral every 6 hours PRN agitation  hydrOXYzine hydrochloride 25 milliGRAM(s) Oral every 8 hours PRN anxiety  ibuprofen  Tablet. 600 milliGRAM(s) Oral every 6 hours PRN Moderate Pain (4 - 6)  LORazepam     Tablet 2 milliGRAM(s) Oral every 6 hours PRN Agitation  sodium chloride 0.65% Nasal 1 Spray(s) Both Nostrils two times a day PRN Nasal Congestion

## 2024-11-22 NOTE — PROGRESS NOTE ADULT - ATTENDING COMMENTS
pt seen with Dr. Jorge pierce virus infection Upper respiratory tract infection/ with nasal congestion, lung exam clear. not covid     Upper respiratory symptoms, nasal congestion - symptomatic treatment only.   - loratidine 10 mg daily   - can add cetrizine 10 mg daily  - flonase bid   - encourage hydration , chicken soup would help  - can add robitussin 10 ml po q 8 hourly if cough bother her.    anticipate symptoms to resolve over next few days.     medicine will sign off, nely CHOUDHARY.

## 2024-11-22 NOTE — BH INPATIENT PSYCHIATRY PROGRESS NOTE - NSBHCONTPROVIDER_PSY_ALL_CORE
No...
Metastatic with known mets to bone. On lupron q3mo and jevtana q3k (last dose 3 weeks ago, next dose was initially due 2/11/2020)  Follows with Dr. Domingo Salmeron [phone]  Onc c/s in AM
No...
No...

## 2024-11-22 NOTE — BH INPATIENT PSYCHIATRY PROGRESS NOTE - NSBHFUPINTERVALHXFT_PSY_A_CORE
Ms. Zurita is a 59 year old woman with a history of alcohol use disorder, MDD, and suicidal ideation. Yesterday in the afternoon, she had an incident with her new roommate, who she says she saw taking some pills.  She reported the incident to hospital staff, who searched both the roommate's and Ms. Zurita's belongings. Ms. Zurita was moved to another room and now has no roommate. She is tearful and anxious to the point of tremulousness when relating this story but was assured that she did the right thing, as she believed any drug use by the roommate put the roommate, Ms. Zurita herself, and the other patients on the unit in some amount of danger. She says she spoke to family on the phone who assured her this was the right thing to do. She seems relieved to be in her own room again and is very preoccupied with how the prior roommate feels about her now.    In an interview with the medical student, the patient was calm and cooperative after initially appearing anxious when relating the events of yesterday and smiled less than she did yesterday. She was oriented toward the future; she said she wanted to go home and feels she will not benefit from staying in the hospital much longer. Still, she said she had gotten a great deal out of her time here.  She learned that she has fewer depressed feelings when she is around others and hopes she will be able to build some community when she returns home. She has tried group therapy activities that she never thought she would be brave enough to try, including poetry and improv. She compared the courage it took to go to these sessions to the courage it took to stand up to one of her sisters on the phone the other day. She is looking forward to having all of her own things again. She feels she has made progress here because she hasn't had to keep house, which she says often distracts her from self-care.    The patient has been coming down with a cold and feels a bit worse today ("I should be wearing a mask, too"), reporting sinus pressure. The medicine team prescribed Zyrtec and Flonase and are doing a viral swab today to evaluate her illness further.     Her anxiety is still high, but seems more focused and was mostly related to the identifiable stressor of the incident with her roommate. She says she has not been feeling sad because she has been around other people, and is looking forward to getting back to her "old self" by finding ways to be around others when she leaves.    Ms. Zurita reports a good response to her new medications, Latuda and Atarax. She feels with Latuda that she "feels lighter" and that "a weight has been lifted." She expresses regret at relying on medicine to control her anxiety but says it is a relief to know that the Atarax can help her get out of an anxious spell if she needs to.    She slept well last night after taking Atarax and melatonin and feels better rested than yesterday. Her appetite is good and she has been going to meals.  She missed afternoon group today because she was caught up in the interview. She denies SI, HI, AVH. Ms. Zurita is a 59 year old woman with a history of alcohol use disorder, MDD, and suicidal ideation. Yesterday in the afternoon, she had an incident with her new roommate, who she says she saw taking some pills.  She reported the incident to hospital staff, who searched both the roommate's and Ms. Zurita's belongings. Ms. Zurita was moved to another room and now has no roommate. She is tearful and anxious to the point of tremulousness when relating this story but was assured that she did the right thing, as she believed any drug use by the roommate put the roommate, . She says she spoke to family on the phone who assured her this was the right thing to do. She seems relieved to be in her own room again and is very preoccupied with how the prior roommate feels about her now.    In an interview with the medical student, the patient was calm and cooperative after initially appearing anxious when relating the events of yesterday and smiled less than she did yesterday. She was oriented toward the future; she said she wanted to go home and feels she will not benefit from staying in the hospital much longer. Still, she said she had gotten a great deal out of her time here.  She learned that she has fewer depressed feelings when she is around others and hopes she will be able to build some community when she returns home. She has tried group therapy activities that she never thought she would be brave enough to try, including poetry and improv. She compared the courage it took to go to these sessions to the courage it took to stand up to one of her sisters on the phone the other day. She is looking forward to having all of her own things again. She feels she has made progress here because she hasn't had to keep house, which she says often distracts her from self-care.    The patient has been coming down with a cold and feels a bit worse today ("I should be wearing a mask, too"), reporting sinus pressure. The medicine team prescribed Zyrtec and Flonase and are doing a viral swab today to evaluate her illness further.     Her anxiety is still high, but seems more focused and was mostly related to the identifiable stressor of the incident with her roommate. She says she has not been feeling sad because she has been around other people, and is looking forward to getting back to her "old self" by finding ways to be around others when she leaves.    Ms. Zurita reports a good response to her new medications, Latuda and Atarax. She feels with Latuda that she "feels lighter" and that "a weight has been lifted." She expresses regret at relying on medicine to control her anxiety but says it is a relief to know that the Atarax can help her get out of an anxious spell if she needs to.    She slept well last night after taking Atarax and melatonin and feels better rested than yesterday. Her appetite is good and she has been going to meals.  She missed afternoon group today because she was caught up in the interview. She denies SI, HI, AVH.

## 2024-11-22 NOTE — BH INPATIENT PSYCHIATRY PROGRESS NOTE - CURRENT MEDICATION
MEDICATIONS  (STANDING):  aspirin  chewable 81 milliGRAM(s) Oral daily  cetirizine 5 milliGRAM(s) Oral daily  cholecalciferol 1000 Unit(s) Oral daily  DULoxetine 60 milliGRAM(s) Oral daily  levothyroxine 100 MICROGram(s) Oral daily  lurasidone 40 milliGRAM(s) Oral at bedtime  melatonin. 5 milliGRAM(s) Oral at bedtime  nicotine -  14 mG/24Hr(s) Patch 14 Patch Transdermal daily  pantoprazole    Tablet 40 milliGRAM(s) Oral before breakfast  rosuvastatin 5 milliGRAM(s) Oral at bedtime    MEDICATIONS  (PRN):  haloperidol     Tablet 5 milliGRAM(s) Oral every 6 hours PRN agitation  hydrOXYzine hydrochloride 25 milliGRAM(s) Oral every 8 hours PRN anxiety  ibuprofen  Tablet. 600 milliGRAM(s) Oral every 6 hours PRN Moderate Pain (4 - 6)  LORazepam     Tablet 2 milliGRAM(s) Oral every 6 hours PRN Agitation  sodium chloride 0.65% Nasal 1 Spray(s) Both Nostrils two times a day PRN Nasal Congestion   MEDICATIONS  (STANDING):  aspirin  chewable 81 milliGRAM(s) Oral daily  cetirizine 5 milliGRAM(s) Oral daily  cholecalciferol 1000 Unit(s) Oral daily  DULoxetine 60 milliGRAM(s) Oral daily  levothyroxine 100 MICROGram(s) Oral daily  lurasidone 40 milliGRAM(s) Oral at bedtime  melatonin. 5 milliGRAM(s) Oral at bedtime  nicotine -  14 mG/24Hr(s) Patch 1 Patch Transdermal daily  pantoprazole    Tablet 40 milliGRAM(s) Oral before breakfast  rosuvastatin 5 milliGRAM(s) Oral at bedtime    MEDICATIONS  (PRN):  benzocaine/menthol Lozenge 1 Lozenge Oral three times a day PRN dry throat  haloperidol     Tablet 5 milliGRAM(s) Oral every 6 hours PRN agitation  hydrOXYzine hydrochloride 25 milliGRAM(s) Oral every 8 hours PRN anxiety  ibuprofen  Tablet. 600 milliGRAM(s) Oral every 6 hours PRN Moderate Pain (4 - 6)  LORazepam     Tablet 2 milliGRAM(s) Oral every 6 hours PRN Agitation  sodium chloride 0.65% Nasal 1 Spray(s) Both Nostrils two times a day PRN Nasal Congestion

## 2024-11-22 NOTE — BH INPATIENT PSYCHIATRY PROGRESS NOTE - NSCGISEVERILLNESS_PSY_ALL_CORE
4 = Moderately ill – overt symptoms causing noticeable, but modest, functional impairment or distress; symptom level may warrant medication
4 = Moderately ill – overt symptoms causing noticeable, but modest, functional impairment or distress; symptom level may warrant medication
5 = Markedly ill - intrusive symptoms that distinctly impair social/occupational function or cause intrusive levels of distress
4 = Moderately ill – overt symptoms causing noticeable, but modest, functional impairment or distress; symptom level may warrant medication

## 2024-11-22 NOTE — BH INPATIENT PSYCHIATRY PROGRESS NOTE - NSICDXBHTERTIARYDX_PSY_ALL_CORE
R/O Major depressive disorder   F32.9  R/O Bipolar depression   F31.9  
R/O Major depressive disorder   F32.9  
R/O Major depressive disorder   F32.9  R/O Bipolar depression   F31.9  

## 2024-11-22 NOTE — BH INPATIENT PSYCHIATRY PROGRESS NOTE - NSBHATTESTBILLING_PSY_A_CORE
30369-Krjzqxyzti OBS or IP - moderate complexity OR 35-49 mins
99548-Rmjfouolsq OBS or IP - moderate complexity OR 35-49 mins
[id number]-Ocfyvjcanr OBS or IP - moderate complexity OR 35-49 mins
01579-Immxdggkjz OBS or IP - moderate complexity OR 35-49 mins
02280-Ktghxdftfb OBS or IP - moderate complexity OR 35-49 mins
76724-Pmugdurjet OBS or IP - moderate complexity OR 35-49 mins

## 2024-11-22 NOTE — BH INPATIENT PSYCHIATRY PROGRESS NOTE - NSBHFUPINTERVALCCFT_PSY_A_CORE
"My energy is lower than normal."
"I'm anxious."
"I felt better yesterday"
"Hi. I just want to find the right medication"
"I'm looking forward to going home"
"I need help finding the right medication"

## 2024-11-22 NOTE — BH INPATIENT PSYCHIATRY PROGRESS NOTE - NSBHASSESSSUMMFT_PSY_ALL_CORE
Patient is a 59 year old female, privately domiciled, unemployed on disability, PPHx of self-reported depression, anxiety, and alcohol use disorder, multiple prior psychiatric hospitalizations, no known SA, history of NSSIB (cutting), PMHx of cervical disc disease s/p spinal fusion, MVP, TIA, hypothyroid, fibromyalgia, admitted for worsening mood and suicidal ideation.    On the unit, patient has been calm, cooperative, and mildly anxious with reports of worsening mood and new onset passive SI over the past ~6 months due to losing friendships, losing disability money due to misunderstanding work limitations, inability to find alternative options to occupy time due to her physical limitations, and feeling more isolated. She has denied SI/HI/AH/VH on the unit which she attributes to groups and other. Patient scored a 19 on the PHQ-9 indicating moderate severe depression and meeting criteria for an episode of MDD if patient does not have Bipolar Depression. Newly reported history of Bipolar disorder in her brother and extensive other fhx of depression raises concern for Bipolar Depression in context of multiple failed trials of antidepressants/anxiolytics. Reports per family of cycling in and out of depression with no manic symptoms while exhibiting notable irritability further support this. The patient remains anxious to degree inhibiting ability to request appropriate help and assistance, but she is overall brighter than on arrival. Plan to continue home duloxetine 60mg daily and continue longitudinal assessments and plan for safe dispo. Starting Lurasidone 20 mg for 2 days before increasing to 40 mg to assess tolerability with likely followup outpatient to assess benefit.    -Continue home duloxetine 60mg daily  -C/w Lurasidone 20 mg nightly. Will increase tomorrow to 40 mg. Already scheduled in EMR.  -C/w Melatonin 5mg qhs insomnia  -C/w Nicotine patch 14 mg to address cravings.  -Haldol 5mg / Ativan 2mg PRN agitation  -Atarax 25mg PRN anxiety  -Continue home levothyroxine, pantoprazole  -Start home rosuvastatin 5 mg nightly. Start Low dose Vitamin D3 supplementation (1000u) daily at patient request.  
Patient is a 59 year old female, privately domiciled, unemployed on disability, PPHx of self-reported depression, anxiety, and alcohol use disorder, multiple prior psychiatric hospitalizations, no known SA, history of NSSIB (cutting), PMHx of cervical disc disease s/p spinal fusion, MVP, TIA, hypothyroid, fibromyalgia, admitted for worsening mood and suicidal ideation.    On exam, patient is calm, cooperative, and mildly anxious. She reports worsening mood and new onset passive SI over the past months due to losing friendships, inability to work (currently on disability and would lose it if she worked), and feeling more isolated. She currently denies SI/HI/AH/VH.  Clinical diagnosis most likely for depression, unspecified and MDD remains on the differential although she does not currently meet criteria. Plan to continue home duloxetine 60mg daily and continue longitudinal assessments and plan for safe dispo.    -Continue home duloxetine 60mg daily, consider titration or cross-titration with alternative antidepressant  -Melatonin 5mg qhs insomnia  -Nicotine patch  -Haldol 5mg / Ativan 2mg PRN agitation  -Atarax 25mg PRN anxiety  -Continue home levothyroxine, pantoprazole
Patient is a 59 year old female, privately domiciled, unemployed on disability, PPHx of self-reported depression, anxiety, and alcohol use disorder, multiple prior psychiatric hospitalizations, no known SA, history of NSSIB (cutting), PMHx of cervical disc disease s/p spinal fusion, MVP, TIA, hypothyroid, fibromyalgia, admitted for worsening mood and suicidal ideation.    On the unit, patient has been calm, cooperative, and mildly anxious with reports of worsening mood and new onset passive SI over the past ~6 months due to losing friendships, losing disability money due to misunderstanding work limitations, inability to find alternative options to occupy time due to her physical limitations, and feeling more isolated. She has denied SI/HI/AH/VH on the unit which she attributes to groups and other. Patient scored a 19 on the PHQ-9 indicating moderate severe depression and meeting criteria for an episode of MDD if patient does not have Bipolar Depression. Newly reported history of Bipolar disorder in her brother and extensive other fhx of depression raises concern for Bipolar Depression in context of multiple failed trials of antidepressants/anxiolytics. Reports per family of cycling in and out of depression with no manic symptoms while exhibiting notable irritability further support this. Plan to continue home duloxetine 60mg daily and continue longitudinal assessments and plan for safe dispo. Starting Lurasidone 20 mg for 2 days before increasing to 40 mg to assess tolerability with likely followup outpatient to assess benefit.    -Continue home duloxetine 60mg daily  -Start Lurasidone 20 mg nightly. Will increase after 2 days to 40 mg. Already scheduled in EMR.  -C/w Melatonin 5mg qhs insomnia  -Increase Nicotine patch from 7 mg to 14 mg to address cravings.  -Haldol 5mg / Ativan 2mg PRN agitation  -Atarax 25mg PRN anxiety  -Continue home levothyroxine, pantoprazole  
Patient is a 59 year old female, privately domiciled, unemployed on disability, PPHx of self-reported depression, anxiety, and alcohol use disorder, multiple prior psychiatric hospitalizations, no known SA, history of NSSIB (cutting), PMHx of cervical disc disease s/p spinal fusion, MVP, TIA, hypothyroid, fibromyalgia, admitted for worsening mood and suicidal ideation.    On the unit, patient has been calm, cooperative, and mildly anxious with reports of worsening mood and new onset passive SI over the past ~6 months due to losing friendships, losing disability money due to misunderstanding work limitations, inability to find alternative options to occupy time due to her physical limitations, and feeling more isolated. She has denied SI/HI/AH/VH on the unit which she attributes to groups and other. Patient scored a 19 on the PHQ-9 indicating moderate severe depression and meeting criteria for an episode of MDD if patient does not have Bipolar Depression. Newly reported history of Bipolar disorder in her brother and extensive other fhx of depression raises concern for Bipolar Depression in context of multiple failed trials of antidepressants/anxiolytics. Reports per family of cycling in and out of depression with no manic symptoms while exhibiting notable irritability further support this. She describes the feeling of her "old self," sunnier and more social, and says she hasn't felt this way in a year. The patient remains anxious to degree inhibiting ability to request appropriate help and assistance, but she is overall brighter than on arrival. She is preoccupied with how others view and respond to her, particularly after telling hospital staff her roommate was taking pills.    Plan to continue home duloxetine 60mg daily and continue longitudinal assessments and plan for safe dispo. Starting Lurasidone 20 mg for 2 days before increasing to 40 mg to assess tolerability with likely followup outpatient to assess benefit.      -Continue home duloxetine 60mg daily  -C/w Lurasidone 40 mg nightly. Will increase tomorrow to 40 mg. Already scheduled in EMR.  -C/w Melatonin 5mg qhs insomnia  -C/w Nicotine patch 14 mg to address cravings.  -Haldol 5mg / Ativan 2mg PRN agitation  -Atarax 25mg PRN anxiety  -Continue home levothyroxine, pantoprazole  -Start home rosuvastatin 5 mg nightly. Start Low dose Vitamin D3 supplementation (1000u) daily at patient request.  
Patient is a 59 year old female, privately domiciled, unemployed on disability, PPHx of self-reported depression, anxiety, and alcohol use disorder, multiple prior psychiatric hospitalizations, no known SA, history of NSSIB (cutting), PMHx of cervical disc disease s/p spinal fusion, MVP, TIA, hypothyroid, fibromyalgia, admitted for worsening mood and suicidal ideation.    On the unit, patient has been calm, cooperative, and mildly anxious with reports of worsening mood and new onset passive SI over the past ~6 months due to losing friendships, losing disability money due to misunderstanding work limitations, inability to find alternative options to occupy time due to her physical limitations, and feeling more isolated. She has denied SI/HI/AH/VH on the unit which she attributes to groups and other . Clinical diagnosis most likely for depression, unspecified and MDD remains on the differential although she does not currently meet criteria. Plan to continue home duloxetine 60mg daily and continue longitudinal assessments and plan for safe dispo. Newly reported history of Bipolar disorder in her brother and extensive other fhx of depression raises concern for Bipolar Depression in context of multiple failed trials of antidepressants/anxiolytics. Will consider Lurasidone augmentation to address depressive symptoms including significant SI.    -Continue home duloxetine 60mg daily, consider titration vs addition of Lurasidone for concern of possible Bipolar Depression  -C/w Melatonin 5mg qhs insomnia  -C/w Nicotine patch  -Haldol 5mg / Ativan 2mg PRN agitation  -Atarax 25mg PRN anxiety  -Continue home levothyroxine, pantoprazole  
Patient is a 59 year old female, privately domiciled, unemployed on disability, PPHx of self-reported depression, anxiety, and alcohol use disorder, multiple prior psychiatric hospitalizations, no known SA, history of NSSIB (cutting), PMHx of cervical disc disease s/p spinal fusion, MVP, TIA, hypothyroid, fibromyalgia, admitted for worsening mood and suicidal ideation.    On the unit, patient has been calm, cooperative, and mildly anxious with reports of worsening mood and new onset passive SI over the past ~6 months due to losing friendships, losing disability money due to misunderstanding work limitations, inability to find alternative options to occupy time due to her physical limitations, and feeling more isolated. She has denied SI/HI/AH/VH on the unit which she attributes to groups and other. Patient scored a 19 on the PHQ-9 indicating moderate severe depression and meeting criteria for an episode of MDD if patient does not have Bipolar Depression. Newly reported history of Bipolar disorder in her brother and extensive other fhx of depression raises concern for Bipolar Depression in context of multiple failed trials of antidepressants/anxiolytics. Reports per family of cycling in and out of depression with no manic symptoms while exhibiting notable irritability further support this. She describes the feeling of her "old self," sunnier and more social, and says she hasn't felt this way in a year. The patient remains anxious to degree inhibiting ability to request appropriate help and assistance, but she is overall brighter than on arrival. Plan to continue home duloxetine 60mg daily and continue longitudinal assessments and plan for safe dispo. Starting Lurasidone 20 mg for 2 days before increasing to 40 mg to assess tolerability with likely followup outpatient to assess benefit.    She reports having more difficulty sleeping due to anxiety after her previous roommate was discharged, inquired about taking Atarax for insomnia due to anxiety going forward.    -Continue home duloxetine 60mg daily  -C/w Lurasidone 20 mg nightly. Will increase tomorrow to 40 mg. Already scheduled in EMR.  -C/w Melatonin 5mg qhs insomnia  -C/w Nicotine patch 14 mg to address cravings.  -Haldol 5mg / Ativan 2mg PRN agitation  -Atarax 25mg PRN anxiety  -Continue home levothyroxine, pantoprazole  -Start home rosuvastatin 5 mg nightly. Start Low dose Vitamin D3 supplementation (1000u) daily at patient request.

## 2024-11-22 NOTE — BH INPATIENT PSYCHIATRY PROGRESS NOTE - NSBHATTESTCOMMENTATTENDFT_PSY_A_CORE
;;11/21: mood somewhat worse than yesterday;  seen by medicine Zyrtec RVP saline for nose ordered;  Not endorsing  suicidal or homicidal ideation intent or plans; no mention of auditory or visual hallucinations .  Latuda raised to 40mg a day for bipolar depression    Current medications aspirin  chewable 81 milliGRAM(s) Oral daily  cetirizine 5 milliGRAM(s) Oral daily for allergies  cholecalciferol 1000 Unit(s) Oral daily  DULoxetine 60 milliGRAM(s) Oral daily for depression not yet tapering .   haloperidol     Tablet 5 milliGRAM(s) Oral every 6 hours PRN  hydrOXYzine hydrochloride 25 milliGRAM(s) Oral every 8 hours PRN  ibuprofen  Tablet. 600 milliGRAM(s) Oral every 6 hours PRN  levothyroxine 100 MICROGram(s) Oral daily for hypothryoidism  LORazepam     Tablet 2 milliGRAM(s) Oral every 6 hours PRN  lurasidone 40 milliGRAM(s) Oral at bedtime for bipolar depression  melatonin. 5 milliGRAM(s) Oral at bedtime for insomnia   nicotine -  14 mG/24Hr(s) Patch 14 Patch Transdermal daily  pantoprazole    Tablet 40 milliGRAM(s) Oral before breakfast for GERD  rosuvastatin 5 milliGRAM(s) Oral at bedtime for HLD  sodium chloride 0.65% Nasal 1 Spray(s) Both Nostrils two times a day PRN for nasal congestion    ;;11/22: mood is improved;  sinus a bit better with Zyrtec and saline spray;  RVP pending ; may be ready to leave on 2/25.  Not endorsing  suicidal or homicidal ideation intent or plans; no mention of auditory or visual hallucinations . Alert; oriented; cognition intact; speech clear; no tremor or evidence of movement impairment.  sad and somewhat constricted.  but no wish for self harm. continue current regime.

## 2024-11-22 NOTE — BH INPATIENT PSYCHIATRY PROGRESS NOTE - NSICDXBHPRIMARYDX_PSY_ALL_CORE
Depression, unspecified   F32.A  

## 2024-11-22 NOTE — BH INPATIENT PSYCHIATRY PROGRESS NOTE - NSDCCRITERIA_PSY_ALL_CORE
Denies SI and affective stability

## 2024-11-22 NOTE — BH INPATIENT PSYCHIATRY PROGRESS NOTE - NSBHMSETHTCONTENT_PSY_A_CORE
Describes significant feelings of being a burden to family, poor self image, acute anxiety when asking for even small things like towels for showering./Ruminations/Guilt Describes significant feelings of being a burden to family, poor self image, acute anxiety when asking for even small things like towels for showering./Preoccupations

## 2024-11-22 NOTE — PROGRESS NOTE ADULT - SUBJECTIVE AND OBJECTIVE BOX
CC: Patient is a 59y old  Female who presents with a chief complaint of nasal congestion     INTERVAL EVENTS: CRISTINA  SUBJECTIVE / INTERVAL HPI: Patient seen and examined at bedside. Improving symptoms.   ROS: negative unless otherwise stated above.    VITAL SIGNS:  Vital Signs Last 24 Hrs  T(C): 37.3 (21 Nov 2024 17:00), Max: 37.3 (21 Nov 2024 17:00)  T(F): 99.2 (21 Nov 2024 17:00), Max: 99.2 (21 Nov 2024 17:00)  HR: 90 (21 Nov 2024 17:00) (85 - 90)  BP: 123/74 (21 Nov 2024 17:00) (123/74 - 129/74)  BP(mean): --  RR: 18 (21 Nov 2024 17:00) (18 - 18)  SpO2: 96% (21 Nov 2024 17:00) (95% - 96%)    Parameters below as of 21 Nov 2024 17:00  Patient On (Oxygen Delivery Method): room air            PHYSICAL EXAM:  General: Alert and oriented x 3. No acute distress. Well-nourished.  Eyes: EOMI. Anicteric.  HEENT: Moist mucous membranes. No scleral icterus. No cervical lymphadenopathy. + Mild frontal sinus tenderness with percussion   Lungs: Clear to auscultation bilaterally. No accessory muscle use.  Cardiovascular: Regular rate and rhythm. No murmur. No JVD.  Abdomen: Soft, non-tender and non-distended. No palpable masses.  Extremities: No edema. Non-tender.  Skin: No rashes or lesions. Warm.  Neurologic: No focal neurological deficits. CN II-XII grossly intact, but not individually tested.  Psychiatric: Cooperative. Appropriate mood and affect.    MEDICATIONS:  MEDICATIONS  (STANDING):  aspirin  chewable 81 milliGRAM(s) Oral daily  cetirizine 5 milliGRAM(s) Oral daily  cholecalciferol 1000 Unit(s) Oral daily  DULoxetine 60 milliGRAM(s) Oral daily  levothyroxine 100 MICROGram(s) Oral daily  lurasidone 40 milliGRAM(s) Oral at bedtime  melatonin. 5 milliGRAM(s) Oral at bedtime  nicotine -  14 mG/24Hr(s) Patch 14 Patch Transdermal daily  pantoprazole    Tablet 40 milliGRAM(s) Oral before breakfast  rosuvastatin 5 milliGRAM(s) Oral at bedtime    MEDICATIONS  (PRN):  haloperidol     Tablet 5 milliGRAM(s) Oral every 6 hours PRN agitation  hydrOXYzine hydrochloride 25 milliGRAM(s) Oral every 8 hours PRN anxiety  ibuprofen  Tablet. 600 milliGRAM(s) Oral every 6 hours PRN Moderate Pain (4 - 6)  LORazepam     Tablet 2 milliGRAM(s) Oral every 6 hours PRN Agitation  sodium chloride 0.65% Nasal 1 Spray(s) Both Nostrils two times a day PRN Nasal Congestion      ALLERGIES:  Allergies    Avelox (Anaphylaxis)  Levaquin (Rash)    Intolerances        LABS:              CAPILLARY BLOOD GLUCOSE          RADIOLOGY & ADDITIONAL TESTS: Reviewed. CC: Patient is a 59y old  Female who presents with a chief complaint of nasal congestion     INTERVAL EVENTS: CRISTINA  SUBJECTIVE / INTERVAL HPI: Patient seen and examined at bedside. Still having R nasal congestion, scratchy throat. Has not yet received supportive medications.   ROS: negative unless otherwise stated above.    VITAL SIGNS:  Vital Signs Last 24 Hrs  T(C): 37.3 (21 Nov 2024 17:00), Max: 37.3 (21 Nov 2024 17:00)  T(F): 99.2 (21 Nov 2024 17:00), Max: 99.2 (21 Nov 2024 17:00)  HR: 90 (21 Nov 2024 17:00) (85 - 90)  BP: 123/74 (21 Nov 2024 17:00) (123/74 - 129/74)  BP(mean): --  RR: 18 (21 Nov 2024 17:00) (18 - 18)  SpO2: 96% (21 Nov 2024 17:00) (95% - 96%)    Parameters below as of 21 Nov 2024 17:00  Patient On (Oxygen Delivery Method): room air            PHYSICAL EXAM:  General: Alert and oriented x 3. No acute distress. Well-nourished.  Eyes: EOMI. Anicteric.  HEENT: Moist mucous membranes. No scleral icterus. No cervical lymphadenopathy. + Mild frontal sinus tenderness with percussion   Lungs: Clear to auscultation bilaterally. No accessory muscle use.  Cardiovascular: Regular rate and rhythm. No murmur. No JVD.  Abdomen: Soft, non-tender and non-distended. No palpable masses.  Extremities: No edema. Non-tender.  Skin: No rashes or lesions. Warm.  Neurologic: No focal neurological deficits. CN II-XII grossly intact, but not individually tested.  Psychiatric: Cooperative. Appropriate mood and affect.    MEDICATIONS:  MEDICATIONS  (STANDING):  aspirin  chewable 81 milliGRAM(s) Oral daily  cetirizine 5 milliGRAM(s) Oral daily  cholecalciferol 1000 Unit(s) Oral daily  DULoxetine 60 milliGRAM(s) Oral daily  levothyroxine 100 MICROGram(s) Oral daily  lurasidone 40 milliGRAM(s) Oral at bedtime  melatonin. 5 milliGRAM(s) Oral at bedtime  nicotine -  14 mG/24Hr(s) Patch 14 Patch Transdermal daily  pantoprazole    Tablet 40 milliGRAM(s) Oral before breakfast  rosuvastatin 5 milliGRAM(s) Oral at bedtime    MEDICATIONS  (PRN):  haloperidol     Tablet 5 milliGRAM(s) Oral every 6 hours PRN agitation  hydrOXYzine hydrochloride 25 milliGRAM(s) Oral every 8 hours PRN anxiety  ibuprofen  Tablet. 600 milliGRAM(s) Oral every 6 hours PRN Moderate Pain (4 - 6)  LORazepam     Tablet 2 milliGRAM(s) Oral every 6 hours PRN Agitation  sodium chloride 0.65% Nasal 1 Spray(s) Both Nostrils two times a day PRN Nasal Congestion      ALLERGIES:  Allergies    Avelox (Anaphylaxis)  Levaquin (Rash)    Intolerances        LABS:              CAPILLARY BLOOD GLUCOSE          RADIOLOGY & ADDITIONAL TESTS: Reviewed.

## 2024-11-22 NOTE — BH INPATIENT PSYCHIATRY PROGRESS NOTE - NSICDXBHSECONDARYDX_PSY_ALL_CORE
Alcohol use disorder, severe, in sustained remission   F10.21  

## 2024-11-22 NOTE — PROGRESS NOTE ADULT - ASSESSMENT
Ms. Zurita is a 59 year old F, privately domiciled, unemployed on disability, PPHx of self-reported depression, anxiety, and alcohol use disorder, multiple prior psychiatric hospitalizations, no known SA, history of NSSIB (cutting), PMHx of cervical disc disease s/p spinal fusion, MVP, TIA, hypothyroid, fibromyalgia, admitted for worsening mood and suicidal ideation. Medicine consulted for w/u for rhinitis.     #Allergic rhinitis   Pt reports hx of allergic rhinitis for which she typically responds well to Cetirizine Qd. Has noticed some nasal discharge, an inflamed R nare, and fullness in her R ear. Some pain in her R submandibular lymph node, though no significant LAD can be appreciated on exam. Pt's sx reportedly worsened once admitted inpatient while not receiving antihistamine therapy. Pt tested negative for limited RVP, however, can commonly miss a myriad of other viruses that an extended/full panel can speciate. but states she has many sick contacts at home prior to her being admitted to [facility]. No fevers, chills, cough, SOB at this time. Signs and symptoms are consistent with allergic rhinitis vs viral URI.   Plan:  - Please obtain a FULL RVP nasal swab   - Can continue Cetirizine 10mg qd or alternatively, Loratadine 10mg Qd   - Please dispense: Saline Nasal Spray & Flonase Semimist nasal spray   - No indication for antibiotics at this time     Thank you for the courtesy of this consult   Discussed with Dr. Connors  Medicine will continue to follow  Ms. Zurita is a 59 year old F, privately domiciled, unemployed on disability, PPHx of self-reported depression, anxiety, and alcohol use disorder, multiple prior psychiatric hospitalizations, no known SA, history of NSSIB (cutting), PMHx of cervical disc disease s/p spinal fusion, MVP, TIA, hypothyroid, fibromyalgia, admitted for worsening mood and suicidal ideation. Medicine consulted for w/u for rhinitis.     #Allergic rhinitis   Pt reports hx of allergic rhinitis for which she typically responds well to Cetirizine Qd. Has noticed some nasal discharge, an inflamed R nare, and fullness in her R ear. Some pain in her R submandibular lymph node, though no significant LAD can be appreciated on exam. Pt's sx reportedly worsened once admitted inpatient while not receiving antihistamine therapy. Pt tested negative for limited RVP, however, can commonly miss a myriad of other viruses that an extended/full panel can speciate. but states she has many sick contacts at home prior to her being admitted to [facility]. No fevers, chills, cough, SOB at this time. Signs and symptoms are consistent with allergic rhinitis vs viral URI.   Plan:  - Please obtain a FULL RVP nasal swab   - Can continue Cetirizine 10mg qd or alternatively, Loratadine 10mg Qd   - Please dispense: Saline Nasal Spray & Flonase Semimist nasal spray   - No indication for antibiotics at this time     Thank you for the courtesy of this consult   Discussed with Dr. Connors  Medicine will continue to follow

## 2024-11-23 PROCEDURE — 99231 SBSQ HOSP IP/OBS SF/LOW 25: CPT

## 2024-11-23 RX ORDER — BENZOCAINE, MENTHOL 15; 3.6 MG/1; MG/1
1 LOZENGE ORAL THREE TIMES A DAY
Refills: 0 | Status: DISCONTINUED | OUTPATIENT
Start: 2024-11-23 | End: 2024-11-25

## 2024-11-23 RX ORDER — NICOTINE 21 MG/24H
1 PATCH, EXTENDED RELEASE TRANSDERMAL DAILY
Refills: 0 | Status: DISCONTINUED | OUTPATIENT
Start: 2024-11-23 | End: 2024-11-25

## 2024-11-23 RX ADMIN — Medication 600 MILLIGRAM(S): at 09:52

## 2024-11-23 RX ADMIN — PANTOPRAZOLE SODIUM 40 MILLIGRAM(S): 40 TABLET, DELAYED RELEASE ORAL at 07:13

## 2024-11-23 RX ADMIN — CETIRIZINE HYDROCHLORIDE 5 MILLIGRAM(S): 10 TABLET ORAL at 09:50

## 2024-11-23 RX ADMIN — Medication 1000 UNIT(S): at 09:51

## 2024-11-23 RX ADMIN — Medication 60 MILLIGRAM(S): at 09:51

## 2024-11-23 RX ADMIN — LURASIDONE HYDROCHLORIDE 40 MILLIGRAM(S): 120 TABLET, FILM COATED ORAL at 21:31

## 2024-11-23 RX ADMIN — ACETAMINOPHEN, DIPHENHYDRAMINE HCL, PHENYLEPHRINE HCL 5 MILLIGRAM(S): 325; 25; 5 TABLET ORAL at 21:31

## 2024-11-23 RX ADMIN — Medication 600 MILLIGRAM(S): at 18:11

## 2024-11-23 RX ADMIN — Medication 81 MILLIGRAM(S): at 09:51

## 2024-11-23 RX ADMIN — ROSUVASTATIN CALCIUM 5 MILLIGRAM(S): 5 TABLET, FILM COATED ORAL at 21:31

## 2024-11-23 RX ADMIN — Medication 600 MILLIGRAM(S): at 18:07

## 2024-11-23 RX ADMIN — HYDROXYZINE HYDROCHLORIDE 25 MILLIGRAM(S): 25 TABLET, FILM COATED ORAL at 21:31

## 2024-11-23 RX ADMIN — NICOTINE 14 PATCH: 21 PATCH, EXTENDED RELEASE TRANSDERMAL at 15:06

## 2024-11-23 RX ADMIN — Medication 600 MILLIGRAM(S): at 15:07

## 2024-11-23 NOTE — PROGRESS NOTE ADULT - ASSESSMENT
Ms. Zurita is a 59 year old F, privately domiciled, unemployed on disability, PPHx of self-reported depression, anxiety, and alcohol use disorder, multiple prior psychiatric hospitalizations, no known SA, history of NSSIB (cutting), PMHx of cervical disc disease s/p spinal fusion, MVP, TIA, hypothyroid, fibromyalgia, admitted for worsening mood and suicidal ideation. Medicine consulted for w/u for rhinitis.     #Allergic rhinitis   Pt reports hx of allergic rhinitis for which she typically responds well to Cetirizine Qd. Has noticed some nasal discharge, an inflamed R nare, and fullness in her R ear. Some pain in her R submandibular lymph node, though no significant LAD can be appreciated on exam. Pt's sx reportedly worsened once admitted inpatient while not receiving antihistamine therapy. Pt tested negative for limited RVP, however, can commonly miss a myriad of other viruses that an extended/full panel can speciate. but states she has many sick contacts at home prior to her being admitted to [facility]. No fevers, chills, cough, SOB at this time. Signs and symptoms are consistent with allergic rhinitis vs viral URI.   Plan:  - Please obtain a FULL RVP nasal swab   - Can continue Cetirizine 10mg qd or alternatively, Loratadine 10mg Qd   - Please dispense: Saline Nasal Spray & Flonase Semimist nasal spray   - No indication for antibiotics at this time     Thank you for the courtesy of this consult   Discussed with Dr. Connors  Medicine will continue to follow  Ms. Zurita is a 59 year old F, privately domiciled, unemployed on disability, PPHx of self-reported depression, anxiety, and alcohol use disorder, multiple prior psychiatric hospitalizations, no known SA, history of NSSIB (cutting), PMHx of cervical disc disease s/p spinal fusion, MVP, TIA, hypothyroid, fibromyalgia, admitted for worsening mood and suicidal ideation. Medicine consulted for w/u for rhinitis.     #Allergic rhinitis #Coronavirus   Pt reports hx of allergic rhinitis for which she typically responds well to Cetirizine Qd. Has noticed some nasal discharge, an inflamed R nare, and fullness in her R ear. Some pain in her R submandibular lymph node, though no significant LAD can be appreciated on exam. Pt's sx reportedly worsened once admitted inpatient while not receiving antihistamine therapy. Pt tested negative for limited RVP, however, can commonly miss a myriad of other viruses that an extended/full panel can speciate. but states she has many sick contacts at home prior to her being admitted to [facility]. No fevers, chills, cough, SOB at this time. Signs and symptoms are consistent with allergic rhinitis vs viral URI.   ** Pt found to have + Corona virus on RVP 11/22/2024 **  Plan:  - Can continue Cetirizine 10mg qd or alternatively, Loratadine 10mg Qd   - Supportive care therapy   - Please dispense: Saline Nasal Spray & Flonase Semimist nasal spray   - No indication for antibiotics at this time     Thank you for the courtesy of this consult   Discussed with Dr. Connors Ms. Zurita is a 59 year old F, privately domiciled, unemployed on disability, PPHx of self-reported depression, anxiety, and alcohol use disorder, multiple prior psychiatric hospitalizations, no known SA, history of NSSIB (cutting), PMHx of cervical disc disease s/p spinal fusion, MVP, TIA, hypothyroid, fibromyalgia, admitted for worsening mood and suicidal ideation. Medicine consulted for w/u for rhinitis.     #Allergic rhinitis #Coronavirus   Pt reports hx of allergic rhinitis for which she typically responds well to Cetirizine Qd. Has noticed some nasal discharge, an inflamed R nare, and fullness in her R ear. Some pain in her R submandibular lymph node, though no significant LAD can be appreciated on exam. Pt's sx reportedly worsened once admitted inpatient while not receiving antihistamine therapy. Pt tested negative for limited RVP, however, can commonly miss a myriad of other viruses that an extended/full panel can speciate. but states she has many sick contacts at home prior to her being admitted to [facility]. No fevers, chills, cough, SOB at this time. Signs and symptoms are consistent with allergic rhinitis vs viral URI.   ** Pt found to have + Corona virus on RVP 11/22/2024 **  Plan:  - Can continue Cetirizine 10mg qd or alternatively, Loratadine 10mg Qd   - Supportive care therapy   - Please dispense: Saline Nasal Spray & Flonase Semimist nasal spray   - No indication for antibiotics at this time     Thank you for allowing us to participate in the care of Ms. Zurita  Medicine will sign off at this time   Discussed with Dr. Connors

## 2024-11-23 NOTE — PROGRESS NOTE ADULT - SUBJECTIVE AND OBJECTIVE BOX
CC: Patient is a 59y old  Female who presents with a chief complaint of nasal congestion.     INTERVAL EVENTS: CRISTINA  SUBJECTIVE / INTERVAL HPI: Patient seen and examined at bedside.   ROS: negative unless otherwise stated above.    VITAL SIGNS:  Vital Signs Last 24 Hrs  T(C): 37.5 (22 Nov 2024 17:42), Max: 37.5 (22 Nov 2024 17:42)  T(F): 99.5 (22 Nov 2024 17:42), Max: 99.5 (22 Nov 2024 17:42)  HR: 88 (22 Nov 2024 17:42) (88 - 94)  BP: 133/77 (22 Nov 2024 17:42) (128/73 - 133/77)  BP(mean): --  RR: --  SpO2: 94% (22 Nov 2024 17:42) (94% - 96%)    Parameters below as of 22 Nov 2024 17:42  Patient On (Oxygen Delivery Method): room air            PHYSICAL EXAM:  General: Alert and oriented x 3. No acute distress. Well-nourished.  Eyes: EOMI. Anicteric.  HEENT: Moist mucous membranes. No scleral icterus. No cervical lymphadenopathy. + Mild frontal sinus tenderness with percussion   Lungs: Clear to auscultation bilaterally. No accessory muscle use.  Cardiovascular: Regular rate and rhythm. No murmur. No JVD.  Abdomen: Soft, non-tender and non-distended. No palpable masses.  Extremities: No edema. Non-tender.  Skin: No rashes or lesions. Warm.  Neurologic: No focal neurological deficits. CN II-XII grossly intact, but not individually tested.  Psychiatric: Cooperative. Appropriate mood and affect.    MEDICATIONS:  MEDICATIONS  (STANDING):  aspirin  chewable 81 milliGRAM(s) Oral daily  cetirizine 5 milliGRAM(s) Oral daily  cholecalciferol 1000 Unit(s) Oral daily  DULoxetine 60 milliGRAM(s) Oral daily  levothyroxine 100 MICROGram(s) Oral daily  lurasidone 40 milliGRAM(s) Oral at bedtime  melatonin. 5 milliGRAM(s) Oral at bedtime  nicotine -  14 mG/24Hr(s) Patch 14 Patch Transdermal daily  pantoprazole    Tablet 40 milliGRAM(s) Oral before breakfast  rosuvastatin 5 milliGRAM(s) Oral at bedtime    MEDICATIONS  (PRN):  haloperidol     Tablet 5 milliGRAM(s) Oral every 6 hours PRN agitation  hydrOXYzine hydrochloride 25 milliGRAM(s) Oral every 8 hours PRN anxiety  ibuprofen  Tablet. 600 milliGRAM(s) Oral every 6 hours PRN Moderate Pain (4 - 6)  LORazepam     Tablet 2 milliGRAM(s) Oral every 6 hours PRN Agitation  sodium chloride 0.65% Nasal 1 Spray(s) Both Nostrils two times a day PRN Nasal Congestion      ALLERGIES:  Allergies    Avelox (Anaphylaxis)  Levaquin (Rash)    Intolerances        LABS:              CAPILLARY BLOOD GLUCOSE          RADIOLOGY & ADDITIONAL TESTS: Reviewed. CC: Patient is a 59y old  Female who presents with a chief complaint of nasal congestion.     INTERVAL EVENTS: CRISTINA  SUBJECTIVE / INTERVAL HPI: Patient seen and examined at bedside. Pt found to have Coronavirus after RVP performed.   ROS: negative unless otherwise stated above.    VITAL SIGNS:  Vital Signs Last 24 Hrs  T(C): 37.5 (22 Nov 2024 17:42), Max: 37.5 (22 Nov 2024 17:42)  T(F): 99.5 (22 Nov 2024 17:42), Max: 99.5 (22 Nov 2024 17:42)  HR: 88 (22 Nov 2024 17:42) (88 - 94)  BP: 133/77 (22 Nov 2024 17:42) (128/73 - 133/77)  BP(mean): --  RR: --  SpO2: 94% (22 Nov 2024 17:42) (94% - 96%)    Parameters below as of 22 Nov 2024 17:42  Patient On (Oxygen Delivery Method): room air            PHYSICAL EXAM:  General: Alert and oriented x 3. No acute distress. Well-nourished.  Eyes: EOMI. Anicteric.  HEENT: Moist mucous membranes. No scleral icterus. No cervical lymphadenopathy. + Mild frontal sinus tenderness with percussion   Lungs: Clear to auscultation bilaterally. No accessory muscle use.  Cardiovascular: Regular rate and rhythm. No murmur. No JVD.  Abdomen: Soft, non-tender and non-distended. No palpable masses.  Extremities: No edema. Non-tender.  Skin: No rashes or lesions. Warm.  Neurologic: No focal neurological deficits. CN II-XII grossly intact, but not individually tested.  Psychiatric: Cooperative. Appropriate mood and affect.    MEDICATIONS:  MEDICATIONS  (STANDING):  aspirin  chewable 81 milliGRAM(s) Oral daily  cetirizine 5 milliGRAM(s) Oral daily  cholecalciferol 1000 Unit(s) Oral daily  DULoxetine 60 milliGRAM(s) Oral daily  levothyroxine 100 MICROGram(s) Oral daily  lurasidone 40 milliGRAM(s) Oral at bedtime  melatonin. 5 milliGRAM(s) Oral at bedtime  nicotine -  14 mG/24Hr(s) Patch 14 Patch Transdermal daily  pantoprazole    Tablet 40 milliGRAM(s) Oral before breakfast  rosuvastatin 5 milliGRAM(s) Oral at bedtime    MEDICATIONS  (PRN):  haloperidol     Tablet 5 milliGRAM(s) Oral every 6 hours PRN agitation  hydrOXYzine hydrochloride 25 milliGRAM(s) Oral every 8 hours PRN anxiety  ibuprofen  Tablet. 600 milliGRAM(s) Oral every 6 hours PRN Moderate Pain (4 - 6)  LORazepam     Tablet 2 milliGRAM(s) Oral every 6 hours PRN Agitation  sodium chloride 0.65% Nasal 1 Spray(s) Both Nostrils two times a day PRN Nasal Congestion      ALLERGIES:  Allergies    Avelox (Anaphylaxis)  Levaquin (Rash)    Intolerances        LABS:              CAPILLARY BLOOD GLUCOSE          RADIOLOGY & ADDITIONAL TESTS: Reviewed.

## 2024-11-24 PROCEDURE — 99231 SBSQ HOSP IP/OBS SF/LOW 25: CPT

## 2024-11-24 RX ADMIN — Medication 600 MILLIGRAM(S): at 13:45

## 2024-11-24 RX ADMIN — Medication 60 MILLIGRAM(S): at 10:39

## 2024-11-24 RX ADMIN — Medication 81 MILLIGRAM(S): at 10:39

## 2024-11-24 RX ADMIN — LURASIDONE HYDROCHLORIDE 40 MILLIGRAM(S): 120 TABLET, FILM COATED ORAL at 21:38

## 2024-11-24 RX ADMIN — NICOTINE 1 PATCH: 21 PATCH, EXTENDED RELEASE TRANSDERMAL at 18:04

## 2024-11-24 RX ADMIN — CETIRIZINE HYDROCHLORIDE 5 MILLIGRAM(S): 10 TABLET ORAL at 10:39

## 2024-11-24 RX ADMIN — PANTOPRAZOLE SODIUM 40 MILLIGRAM(S): 40 TABLET, DELAYED RELEASE ORAL at 07:49

## 2024-11-24 RX ADMIN — Medication 1000 UNIT(S): at 10:39

## 2024-11-24 RX ADMIN — HYDROXYZINE HYDROCHLORIDE 25 MILLIGRAM(S): 25 TABLET, FILM COATED ORAL at 21:38

## 2024-11-24 RX ADMIN — BENZOCAINE, MENTHOL 1 LOZENGE: 15; 3.6 LOZENGE ORAL at 10:42

## 2024-11-24 RX ADMIN — BENZOCAINE, MENTHOL 1 LOZENGE: 15; 3.6 LOZENGE ORAL at 13:03

## 2024-11-24 RX ADMIN — Medication 600 MILLIGRAM(S): at 19:38

## 2024-11-24 RX ADMIN — Medication 600 MILLIGRAM(S): at 13:03

## 2024-11-24 RX ADMIN — HYDROXYZINE HYDROCHLORIDE 25 MILLIGRAM(S): 25 TABLET, FILM COATED ORAL at 10:42

## 2024-11-24 RX ADMIN — Medication 100 MICROGRAM(S): at 07:49

## 2024-11-24 RX ADMIN — ACETAMINOPHEN, DIPHENHYDRAMINE HCL, PHENYLEPHRINE HCL 5 MILLIGRAM(S): 325; 25; 5 TABLET ORAL at 21:38

## 2024-11-24 RX ADMIN — ROSUVASTATIN CALCIUM 5 MILLIGRAM(S): 5 TABLET, FILM COATED ORAL at 21:38

## 2024-11-24 RX ADMIN — NICOTINE 1 PATCH: 21 PATCH, EXTENDED RELEASE TRANSDERMAL at 10:39

## 2024-11-25 VITALS
TEMPERATURE: 98 F | OXYGEN SATURATION: 95 % | SYSTOLIC BLOOD PRESSURE: 134 MMHG | DIASTOLIC BLOOD PRESSURE: 84 MMHG | HEART RATE: 74 BPM

## 2024-11-25 PROCEDURE — 99238 HOSP IP/OBS DSCHRG MGMT 30/<: CPT

## 2024-11-25 RX ORDER — ROSUVASTATIN CALCIUM 5 MG/1
1 TABLET, FILM COATED ORAL
Refills: 0 | DISCHARGE

## 2024-11-25 RX ORDER — BUSPIRONE HCL 15 MG
1 TABLET ORAL
Refills: 0 | DISCHARGE

## 2024-11-25 RX ORDER — CHOLECALCIFEROL (VITAMIN D3) 10MCG/0.25
1000 DROPS ORAL
Qty: 0 | Refills: 0 | DISCHARGE
Start: 2024-11-25

## 2024-11-25 RX ORDER — NICOTINE 21 MG/24H
1 PATCH, EXTENDED RELEASE TRANSDERMAL
Refills: 0 | DISCHARGE

## 2024-11-25 RX ORDER — DULOXETINE HCL 60 MG
1 CAPSULE,DELAYED RELEASE (ENTERIC COATED) ORAL
Qty: 14 | Refills: 0
Start: 2024-11-25

## 2024-11-25 RX ORDER — DULOXETINE HCL 60 MG
1 CAPSULE,DELAYED RELEASE (ENTERIC COATED) ORAL
Refills: 0 | DISCHARGE

## 2024-11-25 RX ORDER — SODIUM CHLORIDE 0.65 %
2 AEROSOL, SPRAY (ML) NASAL
Qty: 1 | Refills: 0
Start: 2024-11-25

## 2024-11-25 RX ORDER — PANTOPRAZOLE SODIUM 40 MG/1
40 TABLET, DELAYED RELEASE ORAL
Refills: 0 | DISCHARGE

## 2024-11-25 RX ORDER — CETIRIZINE HYDROCHLORIDE 10 MG/1
1 TABLET ORAL
Qty: 0 | Refills: 0 | DISCHARGE
Start: 2024-11-25

## 2024-11-25 RX ORDER — CETIRIZINE HYDROCHLORIDE 10 MG/1
1 TABLET ORAL
Qty: 14 | Refills: 0
Start: 2024-11-25

## 2024-11-25 RX ORDER — DIPHENHYDRAMINE HCL 25 MG
1 CAPSULE ORAL
Refills: 0 | DISCHARGE

## 2024-11-25 RX ORDER — LEVOTHYROXINE SODIUM 150 MCG
1 TABLET ORAL
Qty: 30 | Refills: 0
Start: 2024-11-25

## 2024-11-25 RX ORDER — LURASIDONE HYDROCHLORIDE 120 MG/1
1 TABLET, FILM COATED ORAL
Qty: 30 | Refills: 0
Start: 2024-11-25 | End: 2024-12-24

## 2024-11-25 RX ORDER — BENZOCAINE, MENTHOL 15; 3.6 MG/1; MG/1
1 LOZENGE ORAL
Qty: 1 | Refills: 0
Start: 2024-11-25

## 2024-11-25 RX ORDER — NICOTINE 21 MG/24H
1 PATCH, EXTENDED RELEASE TRANSDERMAL
Qty: 14 | Refills: 1
Start: 2024-11-25 | End: 2024-12-22

## 2024-11-25 RX ORDER — HYDROXYZINE HYDROCHLORIDE 25 MG/1
1 TABLET, FILM COATED ORAL
Qty: 28 | Refills: 0
Start: 2024-11-25 | End: 2024-12-08

## 2024-11-25 RX ADMIN — NICOTINE 1 PATCH: 21 PATCH, EXTENDED RELEASE TRANSDERMAL at 10:06

## 2024-11-25 RX ADMIN — PANTOPRAZOLE SODIUM 40 MILLIGRAM(S): 40 TABLET, DELAYED RELEASE ORAL at 07:01

## 2024-11-25 RX ADMIN — Medication 100 MICROGRAM(S): at 07:01

## 2024-11-25 RX ADMIN — Medication 1000 UNIT(S): at 10:06

## 2024-11-25 RX ADMIN — LORAZEPAM 2 MILLIGRAM(S): 2 TABLET ORAL at 13:25

## 2024-11-25 RX ADMIN — Medication 81 MILLIGRAM(S): at 10:06

## 2024-11-25 RX ADMIN — BENZOCAINE, MENTHOL 1 LOZENGE: 15; 3.6 LOZENGE ORAL at 05:34

## 2024-11-25 RX ADMIN — Medication 60 MILLIGRAM(S): at 10:06

## 2024-11-25 RX ADMIN — Medication 600 MILLIGRAM(S): at 11:37

## 2024-11-25 RX ADMIN — CETIRIZINE HYDROCHLORIDE 5 MILLIGRAM(S): 10 TABLET ORAL at 10:06

## 2024-11-25 NOTE — BH INPATIENT PSYCHIATRY DISCHARGE NOTE - NSBHDCMEDICALFT_PSY_A_CORE
Coronavirus positive (not covid 19) on RSV panel. Sore throat. Patient seen by internal medicine, prescribed saline nasal spray, zyrtec 5 mg daily, and benzocaine lozenges. Improvement noted.

## 2024-11-25 NOTE — BH INPATIENT PSYCHIATRY DISCHARGE NOTE - EXTENDED VTE YES NO FOR MLM ENOXAPARIN
Clinic Visit Summary    May 31, 2018      KUNAL SNOW Description:  Male YOB: 1944   MRN: [id number] Provider:  Ye Hill M.D.    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    Vitals (last recorded)  Heading     Test Name B/P P Weight (lb) (lb) Weight (kg) (kg) T (C)   5/31/2018 3:21 /80 89 185.8 84.28 36.2     Allergies (as reported by patient)  Allergy Type Name Reaction      Current Medications (as reported by patient)  Drug Name Dose Strength Route   Xarelto [rivaroxaban]     Oral   Lasix [furosemide]     Oral   Flomax [tamsulosin] Capsule 0.4 mg Oral   ibuprofen 3 tab 200 mg Oral   KCl-20 [potassium chloride]   20 mEq Oral        Medications prescribed today  Date Description Ordering Physician      Orders  Date Description Ordering Physician   5/31/2018 BUN/CREATNINE RATIO + MRI Brain WO/W Contrast Ye Hill     Next Visit Information  Appointment Date Appointment Time Activity   5/31/2018 2:45 PM Registration - [facility]   5/31/2018 3:00 PM RN Follow Up   5/31/2018 3:15 PM Follow Up   9/13/2018 3:00 PM Registration - [facility]   9/13/2018 3:15 PM RN Follow Up   9/13/2018 3:30 PM Follow Up     Special instructions          Reminders    · If you did not schedule your follow up appointment at the time of your visit, please call the department at [phone].  · Please provide a copy of this summary of care to your other providers.  · Please remember to bring the following items to your next appointment:  o Current medications or a list of your current medications.  o Insurance card and a photo ID.  § Please bring insurance and ID cards with you every time you come.  For safety and billing reasons, we must have copies of these documents in your medical record and we must verify your identity on a regular basis.  o Primary care  physician referral, if applicable.  § If you are a member of an HMO or PPO that requires a referral before receiving specialty care, please obtain the referral from your primary care physician (PCP) prior to your appointment.  Please contact your insurance provider to learn if your coverage requires referrals to a specialist. If you do not have a valid referral, you may be asked to reschedule your visit.  Some referrals are valid for a certain number of weeks or visits; please keep track of how many weeks or visits have passed since obtaining the referral so that you will know when to ask your PCP for a new referral.  Obtaining referrals is your responsibility.  Your insurance provider will likely require you to pay the full cost of visits without a valid referral.        This summary document is based solely upon information made available to the [facility] staff as of 05/31/18.  Please notify us of additional information related to your care at [phone].     ,

## 2024-11-25 NOTE — BH DISCHARGE NOTE NURSING/SOCIAL WORK/PSYCH REHAB - NSDCADDINFO2FT_PSY_ALL_CORE
You are scheduled to meet IN-PERSON with your established psychiatrist, Dr. Horan, on 12/9/24 at 11AM.

## 2024-11-25 NOTE — BH INPATIENT PSYCHIATRY DISCHARGE NOTE - ATTENDING DISCHARGE PHYSICAL EXAMINATION:
;;11/25: Not endorsing  suicidal or homicidal ideation intent or plans; no mention of auditory or visual hallucinations Alert; oriented; cognition intact; speech clear; no tremor or evidence of movement impairment. smiles anxiously at tmes.  Future oriented; looks forward to doing volunteer work.  Thinking is congruent with affect; no peculiarities of thinking or language use. Fair to good eye contact; speech clear spontaneous and normal volume

## 2024-11-25 NOTE — BH INPATIENT PSYCHIATRY DISCHARGE NOTE - NSBHASSESSSUMMFT_PSY_ALL_CORE
Patient's depression has improved as shown by PHQ9 improvement from 19 to 11. Her mood and energy level have improved. She is optimistic, bright in affect, expressing benefit from medications, and describes a shift in perspective to feeling more grateful for her life/not wanting to end her life. Appropriate NRT has improved patient's increased appetite/anxiety to a degree. Patient has situational restlessness/fidgeting with acute anxiety but otherwise is not fidgeting at time of discharge.    She says she has not been feeling sad because she has been around other people, and is looking forward to getting back to her "old self" by finding ways to be around others when she leaves. Ms. Zurita reports a good response to her new medications, Latuda and Atarax. She feels with Latuda that she "feels lighter" and that "a weight has been lifted." She expresses regret at relying on medicine to control her anxiety but says it is a relief to know that the Atarax can help her get out of an anxious spell if she needs to.

## 2024-11-25 NOTE — BH PSYCHOLOGY - CLINICIAN PSYCHOTHERAPY NOTE - NSBHPSYCHOLADDL_PSY_A_CORE
ASSESSMENT: Patient is a 59-year-old white who voluntarily presented to the unit on 11/15/2024 due to worsening depression and suicidal ideation. Patient is currently living alone in an apartment and receiving unemployment benefits due to disability. Per chart review, she has a PPH of depression, anxiety, alcohol use disorder, NSSI (cutting), 3 psychiatric hospitalizations (most recently 5 months ago for alcohol intoxication and suicidal ideation), and a history of trauma. She also has a PMH of cervical disc disease s/p spinal fusion, MVP, TIA, hypothyroid, and fibromyalgia. Psychiatric medication upon admission is duloxetine 60mg PO QD. Previous medication trials include sertraline, venlafaxine, sertraline, and escitalopram. Patient appears to be experiencing worsening symptoms of depression and anxiety in the context of recent abstinence from alcohol and subsequent loss of some social support (long-term partner, friends). It appears that she previously used alcohol as a short-term coping strategy to avoid difficult emotions. In the absence of this coping mechanism and without the current ability to engage in others, patient appears to have increased difficulty managing her symptoms. Ms. Zurita would likely continue to benefit from ongoing, weekly outpatient psychotherapy to address loneliness, coping with previously suppressed emotions, and trauma.  DIAGNOSES:  MDD with anxious distress (F32.9)  Alcohol use disorder, severe, in sustained remission (F10.21), per chart review  RISK ASSESSMENT:  [x] SI: worsening over past 6 months, 1x since admission  [] plan [] intent [X] self-harm [] elopement [] aggression [] other [] prior incidences [] family hx of suicide [] family hx of aggression  Static Risk Factors: alcohol use disorder (in remission), multiple psychiatric hospitalizations, history of NSSI and trauma, family history of bipolar disorder and depression  Modifiable Risk Factors: symptoms of depression and anxiety, discomfort with loneliness  Protective Factors: help-seeking, established care with psychiatrist and therapist, supportive family  Clinical Impression of Risk Level: Acute – low; Chronic – moderate; Patient is at low acute risk and moderate chronic risk for suicide. Patient is not currently endorsing suicidal ideation, voluntarily sought help, and is connected with a psychiatrist and therapist. In addition, she has displayed resilience and strength through her decision to undergo medical detox from opioids following surgery and stop consuming alcohol. However, patient has also reported increasingly frequent suicidal ideation prior to admission and expressed particular distress about anhedonia and apathy. She remains focused on figuring out what is wrong with her and how to fix it, which may be a trigger for increased hopelessness if she does not improve and/or find the correct medication regimen.  CAMS to be completed at a later date.  PLAN:  [x] I/G/M therapy [x] psychopharmacotherapy [] discharge planning [] family meeting [] collateral contact [x] psychoeducation  Ms. Zurita will continue to receive individual therapy from clinician. In addition, patient was encouraged to continue attending group therapy and participating in the treatment milieu.
ASSESSMENT:  Patient is a 59-year-old white who voluntarily presented to the unit on 11/15/2024 due to worsening depression and suicidal ideation. Patient is currently living alone in an apartment and receiving unemployment benefits due to disability. Per chart review, she has a PPH of depression, anxiety, alcohol use disorder, NSSI (cutting), 3 psychiatric hospitalizations (most recently 5 months ago for alcohol intoxication and suicidal ideation), and a history of trauma. She also has a PMH of cervical disc disease s/p spinal fusion, MVP, TIA, hypothyroid, and fibromyalgia. Psychiatric medication upon admission is duloxetine 60mg PO QD. Previous medication trials include sertraline, venlafaxine, sertraline, and escitalopram. Patient appears to be experiencing worsening symptoms of depression and anxiety in the context of recent abstinence from alcohol and subsequent loss of some social support (long-term partner, friends). It appears that she previously used alcohol as a short-term coping strategy to avoid difficult emotions. In the absence of this coping mechanism and without the current ability to confide in others, patient appears to have increased difficulty managing her symptoms. Ms. Zurita would likely continue to benefit from ongoing, weekly outpatient psychotherapy to address loneliness, coping with previously suppressed emotions, and trauma.  RISK ASSESSMENT:  [] SI [] plan [] intent [] self-harm [] elopement [] aggression [] other [] prior incidences [] family hx of suicide [] family hx of aggression  Static Risk Factors: alcohol use disorder (in remission), multiple psychiatric hospitalizations, history of NSSI and trauma, family history of bipolar disorder and depression  Modifiable Risk Factors: symptoms of depression and anxiety (particularly anhedonia, apathy), discomfort with loneliness, lack of coping skills  Protective Factors: help-seeking, established care with psychiatrist and therapist, supportive family  Clinical Impression of Risk Level:  Acute – low; Chronic – moderate  Patient is at low acute risk and moderate chronic risk for suicide. Prior to admission, patient reported increasingly frequent suicidal ideation and expressed particular distress about anhedonia and apathy. However, she is not currently endorsing suicidal ideation, voluntarily sought help, and is connected with a psychiatrist and therapist. In addition, patient is currently expressing hope and excitement for the future and has displayed resilience and strength through her decisions to undergo medical detox from opioids following surgery and stop consuming alcohol. Patient reportedly found this admission to be helpful, as she was invested in active participation.   CAMS: Completed with other unit staff.  PLAN:  [X] I/G/M therapy [X] psychopharmacotherapy [] discharge planning [] family meeting [] collateral contact [x] psychoeducation  Ms. Zurita and clinician will discontinue individual therapy due to anticipated discharge.

## 2024-11-25 NOTE — BH INPATIENT PSYCHIATRY DISCHARGE NOTE - NSBHFUPINTERVALHXFT_PSY_A_CORE
Over the weekend, no behavioral incidents, patient tested positive on RSV panel for non-COVID coronavirus (a cold), seen by medicine, given lozenges, nasal spray, zyrtec. Patient reported to be generally pleasant, she took her PRN atarax twice yesterday.    Patient this morning seen in room exhibiting bright affect. She reports poor sleep yesterday night, getting 4 hours, due to increased anxiety related to discharge, but she otherwise feels good. She endorses continued good appetite. She reports having a "break through" yesterday during safety planning in which she experienced a shift in perspective. She describes thinking of her brother who passed away several decades ago at the age of 17 having had intellectual disability of some kind and physical medical impairments. She states that in context of him not even having had a chance at life, she now can't imagine ending her own much better life. She also feels her mood may be slightly better since starting Lurasidone but she was advised that she should experience noticeable effect within 2 weeks. PHQ-9 was administered again for an informal measurement of change. Patient scored an 11 day of discharge down from 19 on 11/19/24 with improvements to her energy level, depressed mood, snacking, and SI. Following yesterday's perspective shift, she newly has more interest in activities. Her feelings of guilt are chronic and unchanged, her severe difficulty concentrating (reading more than a paragraph, focusing on TV) is chronic (20-30+ years) and unchanged, and her restlessness/fidgeting are improved but still generally situational.

## 2024-11-25 NOTE — BH DISCHARGE NOTE NURSING/SOCIAL WORK/PSYCH REHAB - NSCDUDCCRISIS_PSY_A_CORE
.National Suicide Prevention Lifeline 7 [phone]) 739-7904/.  [facility]  1 (423) LIFENET (968-8702)/.  [facility]  [phone]/.  [facility]pline / [facility]  [phone] (3450)/.  [facility]  [address] [place] [id number] [phone]   Hours:  Monday through Friday from 9 AM to 3 PM/988 Suicide and Crisis Lifeline

## 2024-11-25 NOTE — BH INPATIENT PSYCHIATRY DISCHARGE NOTE - NSDCMRMEDTOKEN_GEN_ALL_CORE_FT
cetirizine 5 mg oral tablet: 1 tab(s) orally once a day  cholecalciferol oral tablet: 1000 unit(s) orally once a day  Cymbalta 60 mg oral delayed release capsule: 1 cap(s) orally once a day  hydrOXYzine hydrochloride 25 mg oral tablet: 1 tab(s) orally every 8 hours as needed for anxiety  levothyroxine 100 mcg (0.1 mg) oral capsule: 1 cap(s) orally once a day  lurasidone 40 mg oral tablet: 1 tab(s) orally once a day (at bedtime)  nicotine 14 mg/24 hr transdermal film, extended release: 1 patch transdermal once a day  Pantoprazole: 40 milligram(s) orally once a day before breakfast  rosuvastatin 5 mg oral tablet: 1 tab(s) orally once a day

## 2024-11-25 NOTE — BH INPATIENT PSYCHIATRY DISCHARGE NOTE - HOSPITAL COURSE
Patient initially continued on home Duloxetine 60 mg and general home medications. Patient started on low dose aspirin by medical providers but later confirmed with [facility] rec to not be on aspirin in outpatient. Initially received 0.5 mg ativan po once for anxiety. Patient offered substance treatment, and she accepted nicotine replacement therapy. Patient reported insufficient craving control on 7 mg patch, so was increased to 14 mg with good effect.   Due to history more reminiscent of Bipolar depression, multiple failed antidepressants, and family history of bipolar disorder, Lurasidone trial started (20 mg on 11/19-11/20, 40 mg daily 11/21 onward). No adverse effects noted. Patient receiving PRN Atarax 25 mg 1-2 times per day for acute anxiety. Notable incident mid-hospitalization around 11/22 involving distressing incident with roommate and increased anxiety for 1-2 days after, room change needed. Patient reported significant improvement to mood/anxiety with Lurasidone and Atarax "as if a weight had been lifted". On 11/24/24, patient describes shift in perspective regarding SI and significant improved mood/optimism/no SI. Patient initially continued on home Duloxetine 60 mg and general home medications. Patient started on low dose aspirin by medical providers but later confirmed with [facility] to not be on aspirin in outpatient. Initially received 0.5 mg ativan po once for anxiety. Patient offered substance treatment, and she accepted nicotine replacement therapy. Patient reported insufficient craving control on 7 mg patch, so was increased to 14 mg with good effect.   Due to history more reminiscent of Bipolar depression, multiple failed antidepressants, and family history of bipolar disorder, Lurasidone trial started (20 mg on 11/19-11/20, 40 mg daily 11/21 onward). No adverse effects noted. Patient receiving PRN Atarax 25 mg 1-2 times per day for acute anxiety. Notable incident mid-hospitalization around 11/22 involving distressing incident with roommate and increased anxiety for 1-2 days after, room change needed. Patient reported significant improvement to mood/anxiety with Lurasidone and Atarax "as if a weight had been lifted". On 11/24/24, patient describes shift in perspective regarding SI and significant improved mood/optimism/no SI.      ---xxx    Current medications  no prescription given or needed.    haloperidol     Tablet 5 milliGRAM(s) Oral every 6 hours PRN  ibuprofen  Tablet. 600 milliGRAM(s) Oral every 6 hours PRN  LORazepam     Tablet 2 milliGRAM(s) Oral every 6 hours PRN  pantoprazole    Tablet 40 milliGRAM(s) Oral before breakfast for GERD   rosuvastatin 5 milliGRAM(s) Oral at bedtime for URI   cholecalciferol 1000 Unit(s) Oral daily as supplement   melatonin. 5 milliGRAM(s) Oral at bedtime for insomnia   aspirin  chewable 81 milliGRAM(s) Oral daily for cardiac     ---xxx  Prescriptions given one month supply unless noted otherwise  lurasidone 40 milliGRAM(s) Oral at bedtime for mood stability start tomorrow  *DULoxetine 60 milliGRAM(s) Oral daily for depression start tomorrow two week supply   *benzocaine/menthol Lozenge 1 Lozenge Oral three times a day PRN for URI continue today  hydrOXYzine hydrochloride 25 milliGRAM(s) Oral every 8 hours PRN for anxiety continue today as needed two week supply  *cetirizine 5 milliGRAM(s) Oral daily for allergies start tomorrow two week supply   levothyroxine 100 MICROGram(s) Oral daily for thyroid start tormorrow  nicotine -  14 mG/24Hr(s) Patch 1 Patch Transdermal daily for nicotine start tomorrow two week supply  *sodium chloride 0.65% Nasal 1 Spray(s) Both Nostrils two times a day PRN for nasal congestion start tonight  Take until changed or discontinued by a qualified health care  professional. Patient initially continued on home Duloxetine 60 mg and general home medications. Patient started on low dose aspirin by medical providers but later confirmed with [facility] to not be on aspirin in outpatient. Initially received 0.5 mg ativan po once for anxiety. Patient offered substance treatment, and she accepted nicotine replacement therapy. Patient reported insufficient craving control on 7 mg patch, so was increased to 14 mg with good effect.   Due to history more reminiscent of Bipolar depression, multiple failed antidepressants, and family history of bipolar disorder, Lurasidone trial started (20 mg on 11/19-11/20, 40 mg daily 11/21 onward). No adverse effects noted. Patient receiving PRN Atarax 25 mg 1-2 times per day for acute anxiety. Notable incident mid-hospitalization around 11/22 involving distressing incident with roommate and increased anxiety for 1-2 days after, room change needed. Patient reported significant improvement to mood/anxiety with Lurasidone and Atarax "as if a weight had been lifted". On 11/24/24, patient describes shift in perspective regarding SI and significant improved mood/optimism/no SI.  Patient c/o nasal congestion and sinus pain and was seen by medicine RVP done and Nasal Spray Cetrizine and throat losanges ordered (see below).  No fever or sequelae;  no c/o on day of discharge.     ---xxx    Current medications  no prescription given or needed.    haloperidol     Tablet 5 milliGRAM(s) Oral every 6 hours PRN  ibuprofen  Tablet. 600 milliGRAM(s) Oral every 6 hours PRN  LORazepam     Tablet 2 milliGRAM(s) Oral every 6 hours PRN  pantoprazole    Tablet 40 milliGRAM(s) Oral before breakfast for GERD   rosuvastatin 5 milliGRAM(s) Oral at bedtime for URI   cholecalciferol 1000 Unit(s) Oral daily as supplement   melatonin. 5 milliGRAM(s) Oral at bedtime for insomnia   aspirin  chewable 81 milliGRAM(s) Oral daily for cardiac     ---xxx  Prescriptions given one month supply unless noted otherwise  lurasidone 40 milliGRAM(s) Oral at bedtime for mood stability start tomorrow  DULoxetine 60 milliGRAM(s) Oral daily for depression start tomorrow two week supply   benzocaine/menthol Lozenge 1 Lozenge Oral three times a day PRN for URI continue today  hydrOXYzine hydrochloride 25 milliGRAM(s) Oral every 8 hours PRN for anxiety continue today as needed two week supply  cetirizine 5 milliGRAM(s) Oral daily for allergies start tomorrow two week supply   levothyroxine 100 MICROGram(s) Oral daily for thyroid start tormorrow  nicotine -  14 mG/24Hr(s) Patch 1 Patch Transdermal daily for nicotine start tomorrow two week supply  sodium chloride 0.65% Nasal 1 Spray(s) Both Nostrils two times a day PRN for nasal congestion start tonight  Take until changed or discontinued by a qualified health care  professional.

## 2024-11-25 NOTE — BH INPATIENT PSYCHIATRY DISCHARGE NOTE - REASON FOR ADMISSION
59 year old woman with history of alcohol use disorder, MDD, and suicidal ideation admitted for worsening depression and daily suicidal ideation. 59 year old woman with history of alcohol use disorder, major depression, and suicidal ideation admitted for worsening depression and daily suicidal ideation.

## 2024-11-25 NOTE — BH INPATIENT PSYCHIATRY DISCHARGE NOTE - NSDCCPCAREPLAN_GEN_ALL_CORE_FT
PRINCIPAL DISCHARGE DIAGNOSIS  Diagnosis: Bipolar depression  Assessment and Plan of Treatment:      PRINCIPAL DISCHARGE DIAGNOSIS  Diagnosis: Bipolar depression  Assessment and Plan of Treatment:       SECONDARY DISCHARGE DIAGNOSES  Diagnosis: Upper respiratory virus  Assessment and Plan of Treatment:     Diagnosis: Hypothyroidism  Assessment and Plan of Treatment:

## 2024-11-25 NOTE — BH PSYCHOLOGY - CLINICIAN PSYCHOTHERAPY NOTE - TOKEN PULL-DIAGNOSIS
Primary Diagnosis:  Depression, unspecified [F32.A]        Problem Dx:   Alcohol use disorder, severe, in sustained remission [F10.21]      
Primary Diagnosis:  Depression, unspecified [F32.A]        Problem Dx:   Alcohol use disorder, severe, in sustained remission [F10.21]

## 2024-11-25 NOTE — BH INPATIENT PSYCHIATRY DISCHARGE NOTE - NSBHMSERECMEM_PSY_A_CORE
Rare difficulty remembering topic of conversation due to occasional distractibility with loud stimulus in hallway/Normal

## 2024-11-25 NOTE — BH DISCHARGE NOTE NURSING/SOCIAL WORK/PSYCH REHAB - NSDCCRTYPESERV_GEN_ALL_CORE_FT
[facility] provides free mental health support, in person and online - groups, resources, education and opportunities to make your voice heard. Call: [phone] or [phone]. Email: [email or web address].

## 2024-11-25 NOTE — BH INPATIENT PSYCHIATRY DISCHARGE NOTE - NSBHMETABOLIC_PSY_ALL_CORE_FT
BMI: BMI (kg/m2): 21.8 (11-15-24 @ 14:48)  HbA1c:   Glucose:   BP: 134/84 (11-25-24 @ 10:01) (117/71 - 134/84)Vital Signs Last 24 Hrs  T(C): 36.8 (11-25-24 @ 10:01), Max: 36.8 (11-25-24 @ 10:01)  T(F): 98.3 (11-25-24 @ 10:01), Max: 98.3 (11-25-24 @ 10:01)  HR: 74 (11-25-24 @ 10:01) (74 - 96)  BP: 134/84 (11-25-24 @ 10:01) (125/75 - 134/84)  BP(mean): --  RR: 18 (11-24-24 @ 16:54) (18 - 18)  SpO2: 95% (11-25-24 @ 10:01) (95% - 96%)      Lipid Panel:  BMI: BMI (kg/m2): 21.8 (11-15-24 @ 14:48)  HbA1c:   Glucose: Glucose: 106 mg/dL (11.15.24 @ 15:00)  ; a1c not critical;     BP: 134/84 (11-25-24 @ 10:01) (117/71 - 134/84)Vital Signs Last 24 Hrs  T(C): 36.8 (11-25-24 @ 10:01), Max: 36.8 (11-25-24 @ 10:01)  T(F): 98.3 (11-25-24 @ 10:01), Max: 98.3 (11-25-24 @ 10:01)  HR: 74 (11-25-24 @ 10:01) (74 - 96)  BP: 134/84 (11-25-24 @ 10:01) (125/75 - 134/84)  BP(mean): --  RR: 18 (11-24-24 @ 16:54) (18 - 18)  SpO2: 95% (11-25-24 @ 10:01) (95% - 96%)    Patient anxious labs not completed.  Lipid Panel:

## 2024-11-25 NOTE — BH PSYCHOLOGY - CLINICIAN PSYCHOTHERAPY NOTE - NSBHPSYCHOLNARRATIVE_PSY_A_CORE FT
NARRATIVE:  Subjective: "I actually feel ready to go home."  Objective: Ms. Zuriat and clinician met in her room for a 45-minute individual therapy session. Patient expressed excitement for upcoming discharge and described wanting to find herself again. She stated her intention to develop a daily routine for herself and incorporate things she wants to do, such as listening to music and volunteering, rather than just things she has to do. Ms. Zurita reported that the [facility] had brought about an epiphany regarding her desire to live. She reflected on her brother who was born with special needs and  at 17. Patient shared that things were put into perspective after she realized that her brother did not have a choice about living, but she does. She has also received reassurance from family members, particularly her son, that she is important to them and they want to support her.  In addition, Ms. Zurita stated that she feels ready to leave this time, in contrast to a prior hospitalization about five months ago, when she did not want to be there and tried to be discharged as soon as possible. She described this admission as “voluntary” and shared that she was “genuinely scared” about her suicidal ideation this time. As such, patient has been more open to receiving help this admission and expressed gratitude for her time here.  MENTAL STATUS EXAM:  Appearance: [x] adequately groomed [] disheveled [] malodorous  Behavior: [x] cooperative [] uncooperative [x] good EC [] poor EC [x] well related [] oddly related [] guarded [] PMA [] PMR [] abnormal movements  Speech: [x] normal rate/rhythm/volume [] loud [] quiet [] slow [] rapid [] pressured [] other:   Mood: [x] euthymic [] dysphoric [] anxious [] irritable  Affect: [x] full [] expansive [] constricted [] blunted [] flat [] stable [] labile  Thought Process: [x] organized [] disorganized [x] goal-directed [] concrete [x] logical [] illogical [] circumstantial [] tangential [] impoverished [] effusive [] repetitive  Thought Content: [x] negative for delusions/SI/HI [] positive for delusions/SI/HI  Perception: [x] negative for AVH [] negative for AVH  Insight/Judgment: [x] good [] fair [] poor  Impulse Control: [x] good [] fair [] poor  Cognition: [x] AOx4 [] lacks orientation  DIAGNOSES:  MDD with anxious distress (F32.9)  Alcohol use disorder, severe, in sustained remission (F10.21), per chart review
NARRATIVE:  Sub: "I need to figure out what’s wrong with me."  Obj: Ms. Zurita and clinician met in her room for a 45-minute individual therapy session. She endorsed sxs of depression, including depressed mood, anhedonia, decreased appetite, difficulty sleeping without melatonin, fatigue, irritability, apathy, guilt, worthlessness, self-pity, self-blame, decreased concentration/increased forgetfulness, and suicidal ideation, which began approximately 1 year ago. Ms. Zurita described eating less due to a lack of energy to clean dishes. Patient reported difficulty with apathy and anhedonia, as it is distressing to not feel like herself. She described anxiety due to losing interest in things and not feeling excited about positive events. Patient denied current SI and endorsed 1 instance of SI on the unit when she passively wondered if the paintings in the hallway had glass panes. Patient did not evidence or endorse HI, AVH, delusions, or sxs of mirza.  Patient lives alone in an apartment and cannot drive, with the closest area with shops and public buildings about a 20-minute walk away. She is currently unemployed and has a lot of free time during the week where she is left alone with her thoughts. Ms. Zurita described primarily relying on her family (brother, 2 sisters and son in [place]) for social support, mostly on weekends. Although she enjoys time with family, patient reported immediately returning to apathy and anhedonia once alone. Patient stated that her family is supportive overall, especially her brother who struggled with mental health for decades before stabilizing on the correct medication regimen. One sister is supportive of her struggles, though believes it is weakness to rely on therapy and/or medication in order to overcome them. Ms. Zurita expressed feeling like a burden to her family because they are trying to help her and she feels unable to identify what would be helpful. In reference to writing down important contacts, patient expressed discouragement that her “whole life fits on a Post-It note.”  Ms. Zurita reported that she began abstaining from alcohol use in March 2023. Prior to becoming sober, she had been “blacking out” on a daily basis. Around this time, patient also ended a relationship with a long-term partner and lost contact with “barstool friends” due to their continued alcohol use. Ms. Zurita stated that her partner had been financially supporting her and she had to adjust to less financial means.  Patient expressed desire to identify what is wrong with her and the solution to that problem. Ms. Zurita described seeing her brother struggle with mental health for decades and expressed fear that she will continue to worsen. In addition, she expressed anxiety about medication changes due to past adverse reactions, fear about effect of stopping a medication, and fear about dependence or abuse. The fear of abuse is informed by her decision to enter medical detox from opioids after receiving medications for pain management following a neck surgery in 2019.  Patient and clinician processed her experiences with feeling loneliness for the first time, as she no longer has alcohol, friends, or employment to distract her. Ms. Zurita described having more time to think about her past and difficulty managing uncomfortable feelings coming up for the first time. Patient reportedly began seeing a psychiatrist 1x/month and a therapist 1x/week following discharge from her most recent psychiatric hospitalization. Ms. Zurita and clinician identified building coping strategies to replace alcohol use and processing loneliness and difficult emotions related to her past as goals for future therapy. Clinician provided psychoeducation about depression and encouraged patient to lean on strength and resiliency shown through ability to stop drinking, while making room for vulnerability and allowing others to provide support.  MSE:  Appearance: [x] adequately groomed [] disheveled [] malodorous  Behavior: [x] cooperative [] uncooperative [x] good EC [] poor EC [x] well related [] oddly related [] guarded [] PMA [] PMR [] abnormal movements  Speech: [x] normal rate/rhythm/volume [] loud [] quiet [] slow [] rapid [] pressured [] other:   Mood: [] euthymic [] dysphoric [x] anxious [] irritable  Affect: [x] full [] expansive [] constricted [] blunted [] flat [] stable [] labile  Thought Process: [x] organized [] disorganized [x] goal-directed [] concrete [x] logical [] illogical [] circumstantial [] tangential [] impoverished [] effusive [] repetitive  Thought Content: [x] (-) for delusions/SI/HI [] (+) for delusions/SI/HI  Perception: [x] (-) for AVH [] (+) for AVH  Insight/Judgment: [x] good [] fair [] poor  Impulse Control: [x] good [] fair [] poor  Cognition: [x] AOx4  [] lacks orientation

## 2024-11-25 NOTE — BH INPATIENT PSYCHIATRY DISCHARGE NOTE - HPI (INCLUDE ILLNESS QUALITY, SEVERITY, DURATION, TIMING, CONTEXT, MODIFYING FACTORS, ASSOCIATED SIGNS AND SYMPTOMS)
Patient is a 59 year old female, privately domiciled, unemployed on disability, PPHx of self-reported depression, anxiety, and alcohol use disorder, multiple prior psychiatric hospitalizations, no known SA, history of NSSIB (cutting), PMHx of cervical disc disease s/p spinal fusion, MVP, TIA, hypothyroid, fibromyalgia, admitted for worsening mood and suicidal ideation.      On exam, patient is calm, pleasant, mildly anxious. She currently denies SI/HI/AH/VH. She reports that she has been having worsening mood for the past few months in the setting of multiple psychosocial stressors. Specifically, she has lost touch with multiple friends, and has felt increasingly isolated because she does not work and has been at home with nothing to do. She is on disability and tried to work a few months ago, but was told that if she continued to work that she would have to give up her disability; last week she also found out last week that for the few months that she did work, she must now pay back this money. This made her feel more depressed, and over the past week she felt hopeless with a passive wish to die, although did not have any plan or intent (she thought about cutting her wrists or overdosing, however did not actually want to do it). She also endorses low energy and motivation to do things she likes, such as spend time with her friends. She endorses good appetite, and adequate sleep. She lives by herself, and reports that she often feels lonely. She has also reducing her use of cigarettes recently, which she feels has made her feel more irritable. She shared this with her psychiatrist, Dr. Horan at [facility], who started her on Buspar 15mg BID, but she felt that this made her suicidality worse. After discussing with her therapist, her therapist encouraged her to present to the ED.    She reports a history of 3 prior psychiatric hospitalizations, last 5 months ago in the setting of alcohol intoxication and SI. Since then, she has only drank maximum 1/2 glass of wine about 2x/month. She used to smoke 1 PPD but has been more recently using nicotine patch and tapering; now using 7mg patch. Collins denies all other substances. She currently takes Duloxetine 60mg daily. She reports that at her prior psychiatric hospitalization, they tried to increase it to 120mg daily, but she did not like the way it made her feel. They also tried to taper her off of it, but this worsened her mood. She has tried Zoloft, Lexapro, and Effexor in the past, all of which were ineffective.

## 2024-11-25 NOTE — BH INPATIENT PSYCHIATRY DISCHARGE NOTE - DETAILS
Sexual, physical The patient reports that her brother has bipolar disorder, and mentioned that her 2 sisters are both on anti-depressants.

## 2024-11-25 NOTE — BH INPATIENT PSYCHIATRY DISCHARGE NOTE - OTHER PAST PSYCHIATRIC HISTORY (INCLUDE DETAILS REGARDING ONSET, COURSE OF ILLNESS, INPATIENT/OUTPATIENT TREATMENT)
Per chart review: Patient is a 59 year old female, privately domiciled, unemployed on disability, PPHx of self-reported depression, anxiety, and alcohol use disorder, multiple prior psychiatric hospitalizations, no known SA, history of NSSIB (cutting), PMHx of cervical disc disease s/p spinal fusion, MVP, TIA, hypothyroid, fibromyalgia, admitted for worsening mood and suicidal ideation.   Per family, patient has repeated cycles shifting from normal mood/baseline to significant depression for days. Patient confirms this. Confirmed family history of bipolar disorder in brother.

## 2024-11-26 NOTE — BH SOCIAL WORK CONFIRMATION FOLLOW UP NOTE - NSCOMMENTS_PSY_ALL_CORE
Caring Call: Chart reviewed on 11/26/24. This SW called the patient on 11/26/24 at 4:48PM. VM left with callback information. SW to remain available.     Linkage Call: Chart reviewed on 11/26/24. To be completed after scheduled appointment:     Jamaal Beltre / Alok Dinh (Therapist)  27-Nov-2024 12:00  VIRTUAL APPOINTMENT  [phone]  Mental Health Treatment   Caring Call: Chart reviewed on 11/26/24. This SW called the patient on 11/26/24 at 4:48PM. VM left with callback information. SW to remain available.     Linkage Call: Chart reviewed on 11/26/24. This SW left a VM for the [facility] reception desk ([phone]) on 11/27/24 at 2:12PM. Callback requested & callback information provided. SW to remain available.     [facility] / Alok Dinh (Therapist)  27-Nov-2024 12:00  VIRTUAL APPOINTMENT  [phone]  Mental Health Treatment

## 2024-12-02 DIAGNOSIS — I34.1 NONRHEUMATIC MITRAL (VALVE) PROLAPSE: ICD-10-CM

## 2024-12-02 DIAGNOSIS — F32.A DEPRESSION, UNSPECIFIED: ICD-10-CM

## 2024-12-02 DIAGNOSIS — Z90.89 ACQUIRED ABSENCE OF OTHER ORGANS: ICD-10-CM

## 2024-12-02 DIAGNOSIS — Z86.73 PERSONAL HISTORY OF TRANSIENT ISCHEMIC ATTACK (TIA), AND CEREBRAL INFARCTION WITHOUT RESIDUAL DEFICITS: ICD-10-CM

## 2024-12-02 DIAGNOSIS — R45.851 SUICIDAL IDEATIONS: ICD-10-CM

## 2024-12-02 DIAGNOSIS — E03.9 HYPOTHYROIDISM, UNSPECIFIED: ICD-10-CM

## 2024-12-02 DIAGNOSIS — Z87.891 PERSONAL HISTORY OF NICOTINE DEPENDENCE: ICD-10-CM

## 2024-12-02 DIAGNOSIS — J06.9 ACUTE UPPER RESPIRATORY INFECTION, UNSPECIFIED: ICD-10-CM

## 2024-12-02 DIAGNOSIS — M54.50 LOW BACK PAIN, UNSPECIFIED: ICD-10-CM

## 2024-12-02 DIAGNOSIS — R45.84 ANHEDONIA: ICD-10-CM

## 2024-12-02 DIAGNOSIS — G47.00 INSOMNIA, UNSPECIFIED: ICD-10-CM

## 2024-12-02 DIAGNOSIS — K59.03 DRUG INDUCED CONSTIPATION: ICD-10-CM

## 2024-12-02 DIAGNOSIS — B34.2 CORONAVIRUS INFECTION, UNSPECIFIED: ICD-10-CM

## 2024-12-02 DIAGNOSIS — J30.9 ALLERGIC RHINITIS, UNSPECIFIED: ICD-10-CM

## 2024-12-02 DIAGNOSIS — Z79.890 HORMONE REPLACEMENT THERAPY: ICD-10-CM

## 2024-12-02 DIAGNOSIS — M79.7 FIBROMYALGIA: ICD-10-CM

## 2024-12-02 DIAGNOSIS — F32.9 MAJOR DEPRESSIVE DISORDER, SINGLE EPISODE, UNSPECIFIED: ICD-10-CM

## 2024-12-02 DIAGNOSIS — F10.21 ALCOHOL DEPENDENCE, IN REMISSION: ICD-10-CM

## 2024-12-02 DIAGNOSIS — Z91.52 PERSONAL HISTORY OF NONSUICIDAL SELF-HARM: ICD-10-CM

## 2024-12-19 PROCEDURE — 0225U NFCT DS DNA&RNA 21 SARSCOV2: CPT

## 2025-03-16 PROBLEM — M75.31 CALCIFIC TENDONITIS OF RIGHT SHOULDER: Status: ACTIVE | Noted: 2025-03-16

## 2025-03-31 ENCOUNTER — APPOINTMENT (OUTPATIENT)
Dept: ORTHOPEDIC SURGERY | Facility: CLINIC | Age: 60
End: 2025-03-31
Payer: MEDICARE

## 2025-03-31 VITALS — HEIGHT: 66 IN | BODY MASS INDEX: 23.95 KG/M2 | WEIGHT: 149 LBS

## 2025-03-31 DIAGNOSIS — M75.31 CALCIFIC TENDINITIS OF RIGHT SHOULDER: ICD-10-CM

## 2025-03-31 PROCEDURE — 99214 OFFICE O/P EST MOD 30 MIN: CPT

## 2025-03-31 RX ORDER — MELOXICAM 15 MG/1
15 TABLET ORAL DAILY
Qty: 30 | Refills: 1 | Status: ACTIVE | COMMUNITY
Start: 2025-03-31 | End: 2025-05-30

## 2025-04-02 NOTE — ED BEHAVIORAL HEALTH ASSESSMENT NOTE - NS ED BHA PLAN ADMIT TO PSYCHIATRY ADMIT TO
No recent flareups.  Full range of motion.  Continue routine back care and back exercises.       Other

## 2025-04-21 ENCOUNTER — APPOINTMENT (OUTPATIENT)
Dept: ORTHOPEDIC SURGERY | Facility: CLINIC | Age: 60
End: 2025-04-21
Payer: MEDICARE

## 2025-04-21 DIAGNOSIS — M65.20 CALCIFIC TENDINITIS, UNSPECIFIED SITE: ICD-10-CM

## 2025-04-21 DIAGNOSIS — S60.221A CONTUSION OF RIGHT HAND, INITIAL ENCOUNTER: ICD-10-CM

## 2025-04-21 DIAGNOSIS — M75.42 IMPINGEMENT SYNDROME OF LEFT SHOULDER: ICD-10-CM

## 2025-04-21 PROCEDURE — 20610 DRAIN/INJ JOINT/BURSA W/O US: CPT | Mod: LT

## 2025-04-21 PROCEDURE — 99213 OFFICE O/P EST LOW 20 MIN: CPT | Mod: 25

## 2025-04-21 PROCEDURE — 73030 X-RAY EXAM OF SHOULDER: CPT | Mod: LT

## 2025-04-21 RX ORDER — METHYLPREDNISOLONE 4 MG/1
4 TABLET ORAL
Qty: 1 | Refills: 0 | Status: ACTIVE | COMMUNITY
Start: 2025-04-21 | End: 1900-01-01